# Patient Record
Sex: FEMALE | Race: WHITE | NOT HISPANIC OR LATINO | Employment: OTHER | URBAN - METROPOLITAN AREA
[De-identification: names, ages, dates, MRNs, and addresses within clinical notes are randomized per-mention and may not be internally consistent; named-entity substitution may affect disease eponyms.]

---

## 2019-03-21 RX ORDER — ATORVASTATIN CALCIUM 20 MG/1
TABLET, FILM COATED ORAL
Qty: 90 TABLET | Refills: 3 | Status: SHIPPED | OUTPATIENT
Start: 2019-03-21 | End: 2019-03-26 | Stop reason: SDUPTHER

## 2019-03-26 DIAGNOSIS — E78.5 HYPERLIPIDEMIA, UNSPECIFIED HYPERLIPIDEMIA TYPE: Primary | ICD-10-CM

## 2019-03-26 RX ORDER — ATORVASTATIN CALCIUM 20 MG/1
20 TABLET, FILM COATED ORAL NIGHTLY
Qty: 90 TABLET | Refills: 3 | Status: SHIPPED | OUTPATIENT
Start: 2019-03-26 | End: 2020-06-08

## 2019-03-26 RX ORDER — AMLODIPINE BESYLATE 10 MG/1
TABLET ORAL
Qty: 90 TABLET | Refills: 3 | Status: SHIPPED | OUTPATIENT
Start: 2019-03-26 | End: 2020-03-31

## 2019-09-23 ENCOUNTER — OFFICE VISIT (OUTPATIENT)
Dept: FAMILY MEDICINE | Facility: CLINIC | Age: 79
End: 2019-09-23
Payer: MEDICARE

## 2019-09-23 VITALS
HEART RATE: 73 BPM | TEMPERATURE: 97 F | OXYGEN SATURATION: 98 % | DIASTOLIC BLOOD PRESSURE: 84 MMHG | WEIGHT: 209.63 LBS | SYSTOLIC BLOOD PRESSURE: 150 MMHG | BODY MASS INDEX: 35.79 KG/M2 | RESPIRATION RATE: 16 BRPM | HEIGHT: 64 IN

## 2019-09-23 DIAGNOSIS — N32.81 OVERACTIVE BLADDER: ICD-10-CM

## 2019-09-23 DIAGNOSIS — H35.3210 EXUDATIVE AGE-RELATED MACULAR DEGENERATION OF RIGHT EYE, UNSPECIFIED STAGE: ICD-10-CM

## 2019-09-23 DIAGNOSIS — R53.83 FATIGUE, UNSPECIFIED TYPE: ICD-10-CM

## 2019-09-23 DIAGNOSIS — E78.5 DYSLIPIDEMIA: ICD-10-CM

## 2019-09-23 DIAGNOSIS — Z12.39 BREAST CANCER SCREENING: ICD-10-CM

## 2019-09-23 DIAGNOSIS — F32.A DEPRESSIVE DISORDER: ICD-10-CM

## 2019-09-23 DIAGNOSIS — Z79.899 LONG TERM CURRENT USE OF THERAPEUTIC DRUG: ICD-10-CM

## 2019-09-23 DIAGNOSIS — I10 BENIGN ESSENTIAL HYPERTENSION: Primary | ICD-10-CM

## 2019-09-23 DIAGNOSIS — E55.9 VITAMIN D DEFICIENCY: ICD-10-CM

## 2019-09-23 PROBLEM — D32.9 BENIGN NEOPLASM OF MENINGES: Status: ACTIVE | Noted: 2019-09-23

## 2019-09-23 PROBLEM — K64.8 INTERNAL HEMORRHOIDS: Status: ACTIVE | Noted: 2018-03-21

## 2019-09-23 PROBLEM — M16.9 OSTEOARTHRITIS OF HIP: Status: ACTIVE | Noted: 2019-09-23

## 2019-09-23 PROBLEM — M17.9 OSTEOARTHRITIS OF KNEE: Status: ACTIVE | Noted: 2019-09-23

## 2019-09-23 PROBLEM — K64.4 EXTERNAL HEMORRHOIDS: Status: ACTIVE | Noted: 2018-03-21

## 2019-09-23 PROBLEM — E78.00 PURE HYPERCHOLESTEROLEMIA: Status: ACTIVE | Noted: 2019-09-23

## 2019-09-23 PROCEDURE — 99213 PR OFFICE/OUTPT VISIT, EST, LEVL III, 20-29 MIN: ICD-10-PCS | Mod: S$GLB,,, | Performed by: NURSE PRACTITIONER

## 2019-09-23 PROCEDURE — 99213 OFFICE O/P EST LOW 20 MIN: CPT | Mod: S$GLB,,, | Performed by: NURSE PRACTITIONER

## 2019-09-23 RX ORDER — CITALOPRAM 20 MG/1
1 TABLET, FILM COATED ORAL DAILY
COMMUNITY
End: 2019-09-25 | Stop reason: SDUPTHER

## 2019-09-23 RX ORDER — LOSARTAN POTASSIUM AND HYDROCHLOROTHIAZIDE 12.5; 1 MG/1; MG/1
1 TABLET ORAL DAILY
COMMUNITY
End: 2022-11-21

## 2019-09-23 RX ORDER — METOPROLOL SUCCINATE 50 MG/1
50 TABLET, EXTENDED RELEASE ORAL DAILY
COMMUNITY
End: 2021-08-09 | Stop reason: SDUPTHER

## 2019-09-24 DIAGNOSIS — R73.9 HYPERGLYCEMIA: Primary | ICD-10-CM

## 2019-09-25 RX ORDER — CITALOPRAM 20 MG/1
TABLET, FILM COATED ORAL
Qty: 90 TABLET | Refills: 3 | Status: SHIPPED | OUTPATIENT
Start: 2019-09-25 | End: 2020-10-21

## 2019-10-02 ENCOUNTER — TELEPHONE (OUTPATIENT)
Dept: PHYSICAL MEDICINE AND REHAB | Facility: CLINIC | Age: 79
End: 2019-10-02

## 2019-10-02 NOTE — TELEPHONE ENCOUNTER
Spoke with pt about lab results. Everything looks good, she is prediabetic and to watch sugar intake and starches.

## 2020-02-24 ENCOUNTER — OFFICE VISIT (OUTPATIENT)
Dept: FAMILY MEDICINE | Facility: CLINIC | Age: 80
End: 2020-02-24
Payer: MEDICARE

## 2020-02-24 VITALS
HEIGHT: 64 IN | RESPIRATION RATE: 16 BRPM | DIASTOLIC BLOOD PRESSURE: 68 MMHG | HEART RATE: 75 BPM | WEIGHT: 205.38 LBS | OXYGEN SATURATION: 98 % | SYSTOLIC BLOOD PRESSURE: 154 MMHG | BODY MASS INDEX: 35.06 KG/M2 | TEMPERATURE: 98 F

## 2020-02-24 DIAGNOSIS — M16.0 PRIMARY OSTEOARTHRITIS OF BOTH HIPS: ICD-10-CM

## 2020-02-24 DIAGNOSIS — R20.0 NUMBNESS AND TINGLING IN LEFT ARM: ICD-10-CM

## 2020-02-24 DIAGNOSIS — M19.011 LOCALIZED OSTEOARTHRITIS OF SHOULDER REGIONS, BILATERAL: ICD-10-CM

## 2020-02-24 DIAGNOSIS — M17.0 PRIMARY OSTEOARTHRITIS OF BOTH KNEES: Primary | ICD-10-CM

## 2020-02-24 DIAGNOSIS — M19.012 LOCALIZED OSTEOARTHRITIS OF SHOULDER REGIONS, BILATERAL: ICD-10-CM

## 2020-02-24 DIAGNOSIS — R20.2 NUMBNESS AND TINGLING IN LEFT ARM: ICD-10-CM

## 2020-02-24 PROCEDURE — 99214 PR OFFICE/OUTPT VISIT, EST, LEVL IV, 30-39 MIN: ICD-10-PCS | Mod: S$GLB,,, | Performed by: NURSE PRACTITIONER

## 2020-02-24 PROCEDURE — 99214 OFFICE O/P EST MOD 30 MIN: CPT | Mod: S$GLB,,, | Performed by: NURSE PRACTITIONER

## 2020-02-24 RX ORDER — GABAPENTIN 100 MG/1
100 CAPSULE ORAL 3 TIMES DAILY
Qty: 90 CAPSULE | Refills: 11 | Status: SHIPPED | OUTPATIENT
Start: 2020-02-24 | End: 2021-04-08

## 2020-02-24 NOTE — PROGRESS NOTES
Subjective:       Patient ID: Iman Wood is a 79 y.o. female.    Chief Complaint: Referral (Evaluation of Mechanical scooter) and Pain (joint pain to bilateral shoulders)    HPI     C/o bilateral knee pain, hip pain, and shoulder pain for > 1 year. Mobility increasing difficult as a result of her osteoarthritis. Unable to ambulate more than 200 ft without stopping to rest/sit. This is making it difficult to carry out her routine ADLs.  She is requesting a motorized scooter--specifically states NOT at motorized wheelchair. She spoke with a medicare representative who told her to have her PCP order one.     Review of Systems   Constitutional: Positive for fatigue. Negative for activity change, appetite change, chills and fever.   HENT: Negative for congestion, hearing loss, nosebleeds, postnasal drip, sinus pressure, sore throat and trouble swallowing.    Respiratory: Negative for cough, chest tightness, shortness of breath and wheezing.    Cardiovascular: Negative for chest pain and palpitations.   Gastrointestinal: Negative for abdominal distention, abdominal pain, constipation, diarrhea, nausea and vomiting.   Genitourinary: Negative for difficulty urinating, flank pain, frequency, hematuria and urgency.   Musculoskeletal: Positive for arthralgias and gait problem. Negative for back pain, joint swelling, neck pain and neck stiffness.   Skin: Negative for color change and pallor.   Neurological: Negative for dizziness, seizures, syncope, weakness, light-headedness and headaches.   Hematological: Negative for adenopathy. Does not bruise/bleed easily.   Psychiatric/Behavioral: Negative for agitation, behavioral problems, confusion and sleep disturbance. The patient is not nervous/anxious.        Objective:      Physical Exam   Constitutional: She is oriented to person, place, and time. She appears well-developed and well-nourished.   HENT:   Head: Normocephalic and atraumatic.   Mouth/Throat: Oropharynx is clear and  moist.   Eyes: Pupils are equal, round, and reactive to light. Conjunctivae and EOM are normal. No scleral icterus.   Neck: Trachea normal and normal range of motion. Neck supple. Carotid bruit is not present. No thyroid mass present.   Cardiovascular: Normal rate, regular rhythm and normal heart sounds.   Pulmonary/Chest: Effort normal and breath sounds normal.   Abdominal: Soft. Bowel sounds are normal.   Musculoskeletal: Normal range of motion. She exhibits tenderness. She exhibits no edema or deformity.   Neurological: She is alert and oriented to person, place, and time.   Skin: Skin is warm and dry.   Psychiatric: She has a normal mood and affect. Her behavior is normal. Judgment normal.       Assessment:       1. Primary osteoarthritis of both knees    2. Primary osteoarthritis of both hips    3. Localized osteoarthritis of shoulder regions, bilateral    4. Numbness and tingling in left arm        Plan:       PROBLEM LIST     Iman was seen today for referral and pain.    Diagnoses and all orders for this visit:    Primary osteoarthritis of both knees  -     MOTORIZED SCOOTER FOR HOME USE    Primary osteoarthritis of both hips  -     MOTORIZED SCOOTER FOR HOME USE    Localized osteoarthritis of shoulder regions, bilateral  -     MOTORIZED SCOOTER FOR HOME USE    Numbness and tingling in left arm  -     gabapentin (NEURONTIN) 100 MG capsule; Take 1 capsule (100 mg total) by mouth 3 (three) times daily.    Will send order for mobility assessment for motorized scooter that she is requesting. Instructed her to continue naproxen 500 mg at bedtime. Starting on gabapentin 100 mg TID prn.     I spent 25 minutes with the patient face-to-face, more than 50% was in counseling about medication and patient condition

## 2020-03-31 RX ORDER — AMLODIPINE BESYLATE 10 MG/1
TABLET ORAL
Qty: 90 TABLET | Refills: 3 | Status: SHIPPED | OUTPATIENT
Start: 2020-03-31 | End: 2021-08-09

## 2021-02-25 ENCOUNTER — OFFICE VISIT (OUTPATIENT)
Dept: FAMILY MEDICINE | Facility: CLINIC | Age: 81
End: 2021-02-25
Payer: MEDICARE

## 2021-02-25 VITALS
SYSTOLIC BLOOD PRESSURE: 138 MMHG | DIASTOLIC BLOOD PRESSURE: 76 MMHG | HEART RATE: 72 BPM | WEIGHT: 212.63 LBS | HEIGHT: 64 IN | RESPIRATION RATE: 16 BRPM | OXYGEN SATURATION: 96 % | BODY MASS INDEX: 36.3 KG/M2 | TEMPERATURE: 98 F

## 2021-02-25 DIAGNOSIS — E78.5 DYSLIPIDEMIA: Chronic | ICD-10-CM

## 2021-02-25 DIAGNOSIS — I10 BENIGN ESSENTIAL HYPERTENSION: Chronic | ICD-10-CM

## 2021-02-25 DIAGNOSIS — M26.609 TMJ (TEMPOROMANDIBULAR JOINT SYNDROME): Primary | ICD-10-CM

## 2021-02-25 DIAGNOSIS — F32.A DEPRESSIVE DISORDER: Chronic | ICD-10-CM

## 2021-02-25 PROCEDURE — 99214 OFFICE O/P EST MOD 30 MIN: CPT | Mod: S$GLB,,, | Performed by: NURSE PRACTITIONER

## 2021-02-25 PROCEDURE — 99214 PR OFFICE/OUTPT VISIT, EST, LEVL IV, 30-39 MIN: ICD-10-PCS | Mod: S$GLB,,, | Performed by: NURSE PRACTITIONER

## 2021-03-26 LAB
ABS NRBC COUNT: 0 X 10 3/UL (ref 0–0.01)
ABSOLUTE BASOPHIL: 0.04 X 10 3/UL (ref 0–0.22)
ABSOLUTE EOSINOPHIL: 0.24 X 10 3/UL (ref 0.04–0.54)
ABSOLUTE IMMATURE GRAN: 0.02 X 10 3/UL (ref 0–0.04)
ABSOLUTE LYMPHOCYTE: 1.57 X 10 3/UL (ref 0.86–4.75)
ABSOLUTE MONOCYTE: 0.51 X 10 3/UL (ref 0.22–1.08)
ALBUMIN SERPL-MCNC: 4.6 G/DL (ref 3.5–5.2)
ALBUMIN/GLOB SERPL ELPH: 1.5 {RATIO} (ref 1–2.7)
ALP ISOS SERPL LEV INH-CCNC: 79 U/L (ref 35–105)
ALT (SGPT): 11 U/L (ref 0–33)
ANION GAP SERPL CALC-SCNC: 12 MMOL/L (ref 8–17)
AST SERPL-CCNC: 16 U/L (ref 0–32)
BASOPHILS NFR BLD: 0.6 % (ref 0.2–1.2)
BILIRUBIN, TOTAL: 0.44 MG/DL (ref 0–1.2)
BUN/CREAT SERPL: 21.7 (ref 6–20)
CALCIUM SERPL-MCNC: 10.1 MG/DL (ref 8.6–10.2)
CARBON DIOXIDE, CO2: 30 MMOL/L (ref 22–29)
CHLORIDE: 102 MMOL/L (ref 98–107)
CHOLEST SERPL-MSCNC: 192 MG/DL (ref 100–200)
CREAT SERPL-MCNC: 1.42 MG/DL (ref 0.5–0.9)
EOSINOPHIL NFR BLD: 3.9 % (ref 0.7–7)
GFR ESTIMATION: 35.59
GLOBULIN: 3 G/DL (ref 1.5–4.5)
GLUCOSE: 104 MG/DL (ref 82–115)
HCT VFR BLD AUTO: 46.5 % (ref 37–47)
HDLC SERPL-MCNC: 84 MG/DL
HGB BLD-MCNC: 13.8 G/DL (ref 12–16)
IMMATURE GRANULOCYTES: 0.3 % (ref 0–0.5)
LDL/HDL RATIO: 0.8 (ref 1–3)
LDLC SERPL CALC-MCNC: 66.6 MG/DL (ref 0–100)
LYMPHOCYTES NFR BLD: 25.3 % (ref 19.3–53.1)
MCH RBC QN AUTO: 28.9 PG (ref 27–32)
MCHC RBC AUTO-ENTMCNC: 29.7 G/DL (ref 32–36)
MCV RBC AUTO: 97.5 FL (ref 82–100)
MONOCYTES NFR BLD: 8.2 % (ref 4.7–12.5)
NEUTROPHILS # BLD AUTO: 3.82 X 10 3/UL (ref 2.15–7.56)
NEUTROPHILS NFR BLD: 61.7 %
NUCLEATED RED BLOOD CELLS: 0 /100 WBC (ref 0–0.2)
PLATELET # BLD AUTO: 242 X 10 3/UL (ref 135–400)
POTASSIUM: 3.8 MMOL/L (ref 3.5–5.1)
PROT SNV-MCNC: 7.6 G/DL (ref 6.4–8.3)
RBC # BLD AUTO: 4.77 X 10 6/UL (ref 4.2–5.4)
RDW-SD: 52.4 FL (ref 37–54)
SODIUM: 144 MMOL/L (ref 136–145)
T4, FREE: 1.18 NG/DL (ref 0.93–1.7)
TRIGL SERPL-MCNC: 207 MG/DL (ref 0–150)
TSH SERPL DL<=0.005 MIU/L-ACNC: 1.61 UIU/ML (ref 0.27–4.2)
UREA NITROGEN (BUN): 30.8 MG/DL (ref 8–23)
WBC # BLD: 6.2 X 10 3/UL (ref 4.3–10.8)

## 2021-04-08 ENCOUNTER — OFFICE VISIT (OUTPATIENT)
Dept: FAMILY MEDICINE | Facility: CLINIC | Age: 81
End: 2021-04-08
Payer: MEDICARE

## 2021-04-08 VITALS
TEMPERATURE: 97 F | SYSTOLIC BLOOD PRESSURE: 138 MMHG | OXYGEN SATURATION: 96 % | HEART RATE: 68 BPM | HEIGHT: 64 IN | RESPIRATION RATE: 16 BRPM | BODY MASS INDEX: 35.68 KG/M2 | DIASTOLIC BLOOD PRESSURE: 78 MMHG | WEIGHT: 209 LBS

## 2021-04-08 DIAGNOSIS — M81.0 OSTEOPOROSIS, POSTMENOPAUSAL: ICD-10-CM

## 2021-04-08 DIAGNOSIS — R53.1 ASTHENIA: ICD-10-CM

## 2021-04-08 DIAGNOSIS — E78.5 DYSLIPIDEMIA: ICD-10-CM

## 2021-04-08 DIAGNOSIS — Z13.820 OSTEOPOROSIS SCREENING: ICD-10-CM

## 2021-04-08 DIAGNOSIS — I10 BENIGN ESSENTIAL HYPERTENSION: Chronic | ICD-10-CM

## 2021-04-08 DIAGNOSIS — R73.9 HYPERGLYCEMIA: ICD-10-CM

## 2021-04-08 DIAGNOSIS — R35.89 POLYURIA: Primary | ICD-10-CM

## 2021-04-08 DIAGNOSIS — M26.609 TMJ (TEMPOROMANDIBULAR JOINT SYNDROME): ICD-10-CM

## 2021-04-08 DIAGNOSIS — R73.03 PREDIABETES: ICD-10-CM

## 2021-04-08 DIAGNOSIS — E66.01 CLASS 2 SEVERE OBESITY DUE TO EXCESS CALORIES WITH SERIOUS COMORBIDITY AND BODY MASS INDEX (BMI) OF 35.0 TO 35.9 IN ADULT: ICD-10-CM

## 2021-04-08 PROCEDURE — 83036 HEMOGLOBIN GLYCOSYLATED A1C: CPT | Mod: QW,S$GLB,, | Performed by: NURSE PRACTITIONER

## 2021-04-08 PROCEDURE — 83036 PR  GLYCOSYLATED HEMOGLOBIN TEST: ICD-10-PCS | Mod: QW,S$GLB,, | Performed by: NURSE PRACTITIONER

## 2021-04-08 PROCEDURE — 99214 PR OFFICE/OUTPT VISIT, EST, LEVL IV, 30-39 MIN: ICD-10-PCS | Mod: 25,S$GLB,, | Performed by: NURSE PRACTITIONER

## 2021-04-08 PROCEDURE — 99214 OFFICE O/P EST MOD 30 MIN: CPT | Mod: 25,S$GLB,, | Performed by: NURSE PRACTITIONER

## 2021-04-08 RX ORDER — TIZANIDINE 4 MG/1
4 TABLET ORAL NIGHTLY
COMMUNITY
Start: 2021-03-04 | End: 2021-04-08

## 2021-04-08 RX ORDER — TIZANIDINE 4 MG/1
4 TABLET ORAL NIGHTLY
Qty: 90 TABLET | Refills: 1 | Status: SHIPPED | OUTPATIENT
Start: 2021-04-08 | End: 2021-08-09

## 2021-05-03 ENCOUNTER — TELEPHONE (OUTPATIENT)
Dept: FAMILY MEDICINE | Facility: CLINIC | Age: 81
End: 2021-05-03

## 2021-05-04 DIAGNOSIS — Z12.11 COLON CANCER SCREENING: Primary | ICD-10-CM

## 2021-05-05 ENCOUNTER — OFFICE VISIT (OUTPATIENT)
Dept: FAMILY MEDICINE | Facility: CLINIC | Age: 81
End: 2021-05-05
Payer: MEDICARE

## 2021-05-05 VITALS
OXYGEN SATURATION: 99 % | BODY MASS INDEX: 36.05 KG/M2 | DIASTOLIC BLOOD PRESSURE: 83 MMHG | WEIGHT: 211.19 LBS | HEART RATE: 75 BPM | TEMPERATURE: 98 F | SYSTOLIC BLOOD PRESSURE: 174 MMHG | RESPIRATION RATE: 18 BRPM | HEIGHT: 64 IN

## 2021-05-05 DIAGNOSIS — M10.9 ACUTE GOUTY ARTHROPATHY: Primary | ICD-10-CM

## 2021-05-05 DIAGNOSIS — R26.89 IMPAIRED GAIT AND MOBILITY: ICD-10-CM

## 2021-05-05 DIAGNOSIS — I10 BENIGN ESSENTIAL HYPERTENSION: ICD-10-CM

## 2021-05-05 PROCEDURE — 99213 PR OFFICE/OUTPT VISIT, EST, LEVL III, 20-29 MIN: ICD-10-PCS | Mod: 25,S$GLB,, | Performed by: NURSE PRACTITIONER

## 2021-05-05 PROCEDURE — 96372 PR INJECTION,THERAP/PROPH/DIAG2ST, IM OR SUBCUT: ICD-10-PCS | Mod: S$GLB,,, | Performed by: NURSE PRACTITIONER

## 2021-05-05 PROCEDURE — 99213 OFFICE O/P EST LOW 20 MIN: CPT | Mod: 25,S$GLB,, | Performed by: NURSE PRACTITIONER

## 2021-05-05 PROCEDURE — 96372 THER/PROPH/DIAG INJ SC/IM: CPT | Mod: S$GLB,,, | Performed by: NURSE PRACTITIONER

## 2021-05-05 RX ORDER — BETAMETHASONE SODIUM PHOSPHATE AND BETAMETHASONE ACETATE 3; 3 MG/ML; MG/ML
12 INJECTION, SUSPENSION INTRA-ARTICULAR; INTRALESIONAL; INTRAMUSCULAR; SOFT TISSUE ONCE
Status: COMPLETED | OUTPATIENT
Start: 2021-05-05 | End: 2021-05-05

## 2021-05-05 RX ORDER — TRAMADOL HYDROCHLORIDE 50 MG/1
50 TABLET ORAL EVERY 8 HOURS PRN
Qty: 15 TABLET | Refills: 0 | Status: SHIPPED | OUTPATIENT
Start: 2021-05-05 | End: 2021-05-10

## 2021-05-05 RX ORDER — INDOMETHACIN 50 MG/1
50 CAPSULE ORAL
Qty: 15 CAPSULE | Refills: 0 | Status: SHIPPED | OUTPATIENT
Start: 2021-05-05 | End: 2021-05-10

## 2021-05-05 RX ADMIN — BETAMETHASONE SODIUM PHOSPHATE AND BETAMETHASONE ACETATE 12 MG: 3; 3 INJECTION, SUSPENSION INTRA-ARTICULAR; INTRALESIONAL; INTRAMUSCULAR; SOFT TISSUE at 11:05

## 2021-08-09 RX ORDER — METOPROLOL SUCCINATE 50 MG/1
50 TABLET, EXTENDED RELEASE ORAL DAILY
Qty: 90 TABLET | Refills: 3 | Status: SHIPPED | OUTPATIENT
Start: 2021-08-09 | End: 2022-05-31

## 2021-09-07 LAB — CRC RECOMMENDATION EXT: NORMAL

## 2021-09-21 ENCOUNTER — OFFICE VISIT (OUTPATIENT)
Dept: FAMILY MEDICINE | Facility: CLINIC | Age: 81
End: 2021-09-21
Payer: MEDICARE

## 2021-09-21 VITALS
TEMPERATURE: 99 F | DIASTOLIC BLOOD PRESSURE: 76 MMHG | RESPIRATION RATE: 16 BRPM | SYSTOLIC BLOOD PRESSURE: 141 MMHG | HEART RATE: 74 BPM | WEIGHT: 211.81 LBS | OXYGEN SATURATION: 98 % | BODY MASS INDEX: 36.16 KG/M2 | HEIGHT: 64 IN

## 2021-09-21 DIAGNOSIS — M10.9 GOUT, ARTHROPATHY: ICD-10-CM

## 2021-09-21 DIAGNOSIS — H10.13 ALLERGIC CONJUNCTIVITIS OF BOTH EYES: Primary | ICD-10-CM

## 2021-09-21 DIAGNOSIS — R05.9 COUGH: ICD-10-CM

## 2021-09-21 PROCEDURE — 99213 PR OFFICE/OUTPT VISIT, EST, LEVL III, 20-29 MIN: ICD-10-PCS | Mod: S$GLB,,, | Performed by: NURSE PRACTITIONER

## 2021-09-21 PROCEDURE — 99213 OFFICE O/P EST LOW 20 MIN: CPT | Mod: S$GLB,,, | Performed by: NURSE PRACTITIONER

## 2021-09-21 RX ORDER — COLCHICINE 0.6 MG/1
TABLET ORAL
Qty: 7 TABLET | Refills: 3 | Status: SHIPPED | OUTPATIENT
Start: 2021-09-21 | End: 2023-03-03 | Stop reason: SDUPTHER

## 2021-09-21 RX ORDER — BENZONATATE 200 MG/1
200 CAPSULE ORAL 3 TIMES DAILY PRN
Qty: 30 CAPSULE | Refills: 0 | Status: SHIPPED | OUTPATIENT
Start: 2021-09-21 | End: 2021-10-01

## 2021-09-21 RX ORDER — AZELASTINE HYDROCHLORIDE 0.5 MG/ML
1 SOLUTION/ DROPS OPHTHALMIC 2 TIMES DAILY
Qty: 4 ML | Refills: 0 | Status: SHIPPED | OUTPATIENT
Start: 2021-09-21 | End: 2021-10-18

## 2021-11-22 RX ORDER — CITALOPRAM 20 MG/1
20 TABLET, FILM COATED ORAL DAILY
Qty: 90 TABLET | Refills: 3 | Status: SHIPPED | OUTPATIENT
Start: 2021-11-22 | End: 2022-12-02

## 2021-12-28 ENCOUNTER — TELEPHONE (OUTPATIENT)
Dept: FAMILY MEDICINE | Facility: CLINIC | Age: 81
End: 2021-12-28
Payer: MEDICARE
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
## 2021-12-28 NOTE — TELEPHONE ENCOUNTER
----- Message from Evonne Moseley sent at 12/28/2021  3:25 PM CST -----  Contact: PT  .Type:  Patient Returning Call    Who Called: Iman   Who Left Message for Patient:   Does the patient know what this is regarding?: missed call   Would the patient rather a call back or a response via [facility]?  Callback   Best Call Back Number: [phone] (home)    Additional Information:

## 2021-12-29 PROBLEM — H35.3190 NONEXUDATIVE AGE-RELATED MACULAR DEGENERATION: Status: ACTIVE | Noted: 2021-12-29

## 2021-12-29 PROBLEM — Z52.5: Status: ACTIVE | Noted: 2021-12-29

## 2022-03-10 ENCOUNTER — OFFICE VISIT (OUTPATIENT)
Dept: FAMILY MEDICINE | Facility: CLINIC | Age: 82
End: 2022-03-10
Payer: MEDICARE

## 2022-03-10 VITALS
SYSTOLIC BLOOD PRESSURE: 136 MMHG | DIASTOLIC BLOOD PRESSURE: 73 MMHG | WEIGHT: 205 LBS | OXYGEN SATURATION: 97 % | HEIGHT: 64 IN | HEART RATE: 84 BPM | BODY MASS INDEX: 35 KG/M2 | TEMPERATURE: 97 F | RESPIRATION RATE: 18 BRPM

## 2022-03-10 DIAGNOSIS — H21.241: Chronic | ICD-10-CM

## 2022-03-10 DIAGNOSIS — H54.7 LOW VISION, UNSPECIFIED LEFT EYE VISUAL IMPAIRMENT CATEGORY, UNSPECIFIED RIGHT EYE VISUAL IMPAIRMENT CATEGORY: Chronic | ICD-10-CM

## 2022-03-10 DIAGNOSIS — H02.401 PTOSIS OF RIGHT UPPER EYELID: ICD-10-CM

## 2022-03-10 DIAGNOSIS — M10.9 GOUTY ARTHROPATHY: ICD-10-CM

## 2022-03-10 DIAGNOSIS — I10 BENIGN ESSENTIAL HYPERTENSION: Chronic | ICD-10-CM

## 2022-03-10 DIAGNOSIS — H35.3190 NONEXUDATIVE AGE-RELATED MACULAR DEGENERATION, UNSPECIFIED LATERALITY, UNSPECIFIED STAGE: ICD-10-CM

## 2022-03-10 DIAGNOSIS — E78.2 MIXED HYPERLIPIDEMIA: Chronic | ICD-10-CM

## 2022-03-10 DIAGNOSIS — H61.23 BILATERAL IMPACTED CERUMEN: Primary | ICD-10-CM

## 2022-03-10 PROBLEM — E78.5 DYSLIPIDEMIA: Status: RESOLVED | Noted: 2019-09-23 | Resolved: 2022-03-10

## 2022-03-10 PROCEDURE — 69209 PR REMOVAL IMPACTED CERUMEN USING IRRIGATION/LAVAGE, UNILATERAL: ICD-10-PCS | Mod: 50,S$GLB,, | Performed by: NURSE PRACTITIONER

## 2022-03-10 PROCEDURE — 69209 REMOVE IMPACTED EAR WAX UNI: CPT | Mod: 50,S$GLB,, | Performed by: NURSE PRACTITIONER

## 2022-03-10 PROCEDURE — 99213 OFFICE O/P EST LOW 20 MIN: CPT | Mod: 25,S$GLB,, | Performed by: NURSE PRACTITIONER

## 2022-03-10 PROCEDURE — 99213 PR OFFICE/OUTPT VISIT, EST, LEVL III, 20-29 MIN: ICD-10-PCS | Mod: 25,S$GLB,, | Performed by: NURSE PRACTITIONER
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
## 2022-04-04 ENCOUNTER — OFFICE VISIT (OUTPATIENT)
Dept: FAMILY MEDICINE | Facility: CLINIC | Age: 82
End: 2022-04-04
Payer: MEDICARE

## 2022-04-04 VITALS
WEIGHT: 206 LBS | HEART RATE: 86 BPM | BODY MASS INDEX: 35.36 KG/M2 | DIASTOLIC BLOOD PRESSURE: 80 MMHG | SYSTOLIC BLOOD PRESSURE: 151 MMHG | RESPIRATION RATE: 16 BRPM | OXYGEN SATURATION: 98 %

## 2022-04-04 DIAGNOSIS — T78.40XA ALLERGY, INITIAL ENCOUNTER: ICD-10-CM

## 2022-04-04 DIAGNOSIS — Z76.89 ESTABLISHING CARE WITH NEW DOCTOR, ENCOUNTER FOR: Primary | ICD-10-CM

## 2022-04-04 DIAGNOSIS — F32.A DEPRESSIVE DISORDER: ICD-10-CM

## 2022-04-04 DIAGNOSIS — I10 HYPERTENSION, UNSPECIFIED TYPE: ICD-10-CM

## 2022-04-04 DIAGNOSIS — Z79.899 LONG TERM CURRENT USE OF THERAPEUTIC DRUG: ICD-10-CM

## 2022-04-04 DIAGNOSIS — E78.00 PURE HYPERCHOLESTEROLEMIA: ICD-10-CM

## 2022-04-04 DIAGNOSIS — R11.0 NAUSEA: ICD-10-CM

## 2022-04-04 DIAGNOSIS — R05.9 COUGH: ICD-10-CM

## 2022-04-04 DIAGNOSIS — R73.03 PREDIABETES: ICD-10-CM

## 2022-04-04 DIAGNOSIS — R23.2 HOT FLASHES: ICD-10-CM

## 2022-04-04 DIAGNOSIS — N18.31 STAGE 3A CHRONIC KIDNEY DISEASE: ICD-10-CM

## 2022-04-04 PROCEDURE — 99214 PR OFFICE/OUTPT VISIT, EST, LEVL IV, 30-39 MIN: ICD-10-PCS | Mod: S$GLB,,, | Performed by: NURSE PRACTITIONER

## 2022-04-04 PROCEDURE — 99214 OFFICE O/P EST MOD 30 MIN: CPT | Mod: S$GLB,,, | Performed by: NURSE PRACTITIONER

## 2022-04-04 RX ORDER — LORATADINE 10 MG/1
10 TABLET ORAL DAILY
Qty: 30 TABLET | Refills: 11 | Status: SHIPPED | OUTPATIENT
Start: 2022-04-04 | End: 2022-10-18
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
## 2022-04-08 LAB
% SATURATION: 17 % (ref 20–50)
25(OH)D3 SERPL-MCNC: 30 NG/ML
ALBUMIN SERPL BCP-MCNC: 3.8 G/DL (ref 3.4–5)
ALBUMIN/GLOBULIN RATIO: 0.88 RATIO (ref 1.1–1.8)
ALP SERPL-CCNC: 80 U/L (ref 46–116)
ALT SERPL W P-5'-P-CCNC: 14 U/L (ref 12–78)
ANION GAP SERPL CALC-SCNC: 11 MMOL/L (ref 3–11)
APPEARANCE, UA: ABNORMAL
AST SERPL-CCNC: 19 U/L (ref 15–37)
BACTERIA SPEC CULT: ABNORMAL /HPF
BASOPHILS NFR BLD: 0.6 % (ref 0–3)
BILIRUB SERPL-MCNC: 0.4 MG/DL (ref 0–1)
BILIRUB UR QL STRIP: NEGATIVE MG/DL
BUN SERPL-MCNC: 35 MG/DL (ref 7–18)
BUN/CREAT SERPL: 22.29 RATIO (ref 7–18)
CALCIUM SERPL-MCNC: 9.7 MG/DL (ref 8.8–10.5)
CHLORIDE SERPL-SCNC: 102 MMOL/L (ref 100–108)
CHOLEST SERPL-MSCNC: 199 MG/DL
CO2 SERPL-SCNC: 27 MMOL/L (ref 21–32)
COLOR UR: ABNORMAL
CREAT SERPL-MCNC: 1.57 MG/DL (ref 0.55–1.02)
CREATININE, RANDOM URINE: 148 MG/DL (ref 10–175)
CREATININE, URINE: 147.6 MG/DL (ref 10–175)
EOSINOPHIL NFR BLD: 3.5 % (ref 1–3)
ERYTHROCYTE [DISTWIDTH] IN BLOOD BY AUTOMATED COUNT: 14 % (ref 12.5–18)
FOLATE: 18.9 NG/ML (ref 3.1–17.5)
GFR ESTIMATION: 32
GLOBULIN: 4.3 G/DL (ref 2.3–3.5)
GLUCOSE (UA): NORMAL MG/DL
GLUCOSE SERPL-MCNC: 114 MG/DL (ref 70–110)
HBA1C MFR BLD: 6.3 % (ref 4.5–6.2)
HCT VFR BLD AUTO: 44.4 % (ref 37–47)
HDL/CHOLESTEROL RATIO: 2.2 RATIO
HDLC SERPL-MCNC: 92 MG/DL (ref 39–96)
HGB BLD-MCNC: 14.1 G/DL (ref 12–16)
HGB UR QL STRIP: 50 /UL
IRON: 55 UG/DL (ref 26–170)
KETONES UR QL STRIP: NEGATIVE MG/DL
LDLC SERPL CALC-MCNC: 70.8 MG/DL
LEUKOCYTE ESTERASE UR QL STRIP: 500 /UL
LYMPHOCYTES NFR BLD: 18.2 % (ref 25–40)
MAGNESIUM SERPL-MCNC: 2.4 MG/DL (ref 1.8–2.4)
MCH RBC QN AUTO: 29.3 PG (ref 27–31.2)
MCHC RBC AUTO-ENTMCNC: 31.8 G/DL (ref 31.8–35.4)
MCV RBC AUTO: 92.3 FL (ref 80–97)
MICROALBUMIN URINE: 42.3 MG/L (ref 0–20)
MICROALBUMIN/CREATININE RATIO: 28 MG/G (ref 0–30)
MONOCYTES NFR BLD: 7.6 % (ref 1–15)
NEUTROPHILS # BLD AUTO: 5.54 10*3/UL (ref 1.8–7.7)
NEUTROPHILS NFR BLD: 69.8 % (ref 37–80)
NITRITE UR QL STRIP: NEGATIVE
NUCLEATED RED BLOOD CELLS: 0 %
PH UR STRIP: 6 PH (ref 5–9)
PLATELETS: 234 10*3/UL (ref 142–424)
POTASSIUM SERPL-SCNC: 4.3 MMOL/L (ref 3.6–5.2)
PROT SERPL-MCNC: 8.1 G/DL (ref 6.4–8.2)
PROT UR QL STRIP: ABNORMAL MG/DL
RBC # BLD AUTO: 4.81 10*6/UL (ref 4.2–5.4)
RBC #/AREA URNS HPF: ABNORMAL /HPF (ref 0–2)
SERVICE COMMENT 03: ABNORMAL
SODIUM BLD-SCNC: 140 MMOL/L (ref 135–145)
SP GR UR STRIP: 1.02 (ref 1–1.03)
SPECIMEN COLLECTION METHOD, URINE: ABNORMAL
SQUAMOUS EPITHELIAL, UA: ABNORMAL /LPF
T4 FREE SP9 P CHAL SERPL-SCNC: 1 NG/DL (ref 0.76–1.46)
TOTAL IRON BINDING CAPACITY: 320 UG/DL (ref 250–450)
TRIGL SERPL-MCNC: 181 MG/DL (ref 30–200)
TSH SERPL DL<=0.005 MIU/L-ACNC: 1.41 UIU/ML (ref 0.36–3.74)
UROBILINOGEN UR STRIP-ACNC: NORMAL MG/DL
VITAMIN B12: 442 PG/ML (ref 193–986)
VLDL CHOLESTEROL: 36 MG/DL (ref 0–40)
WBC # BLD: 7.9 10*3/UL (ref 4.6–10.2)
WBC #/AREA URNS HPF: ABNORMAL /HPF (ref 0–5)

## 2022-04-09 LAB — URINE CULTURE, ROUTINE: NORMAL

## 2022-04-20 ENCOUNTER — OFFICE VISIT (OUTPATIENT)
Dept: FAMILY MEDICINE | Facility: CLINIC | Age: 82
End: 2022-04-20
Payer: MEDICARE

## 2022-04-20 VITALS
HEART RATE: 65 BPM | RESPIRATION RATE: 16 BRPM | SYSTOLIC BLOOD PRESSURE: 144 MMHG | OXYGEN SATURATION: 99 % | DIASTOLIC BLOOD PRESSURE: 70 MMHG

## 2022-04-20 DIAGNOSIS — Z71.2 ENCOUNTER TO DISCUSS TEST RESULTS: ICD-10-CM

## 2022-04-20 DIAGNOSIS — R31.29 MICROSCOPIC HEMATURIA: ICD-10-CM

## 2022-04-20 DIAGNOSIS — R73.03 PREDIABETES: ICD-10-CM

## 2022-04-20 DIAGNOSIS — Z79.899 LONG TERM CURRENT USE OF THERAPEUTIC DRUG: ICD-10-CM

## 2022-04-20 DIAGNOSIS — E61.1 IRON DEFICIENCY: ICD-10-CM

## 2022-04-20 DIAGNOSIS — I10 BENIGN ESSENTIAL HYPERTENSION: ICD-10-CM

## 2022-04-20 DIAGNOSIS — R77.1 ABNORMALITY OF GLOBULIN: ICD-10-CM

## 2022-04-20 DIAGNOSIS — N18.32 STAGE 3B CHRONIC KIDNEY DISEASE: Primary | ICD-10-CM

## 2022-04-20 PROCEDURE — 99214 PR OFFICE/OUTPT VISIT, EST, LEVL IV, 30-39 MIN: ICD-10-PCS | Mod: S$GLB,,, | Performed by: NURSE PRACTITIONER

## 2022-04-20 PROCEDURE — 99214 OFFICE O/P EST MOD 30 MIN: CPT | Mod: S$GLB,,, | Performed by: NURSE PRACTITIONER

## 2022-04-20 RX ORDER — METFORMIN HYDROCHLORIDE 500 MG/1
500 TABLET, EXTENDED RELEASE ORAL
Qty: 90 TABLET | Refills: 0 | Status: SHIPPED | OUTPATIENT
Start: 2022-04-20 | End: 2022-05-31

## 2022-04-20 RX ORDER — FERROUS GLUCONATE 324(38)MG
TABLET ORAL
Qty: 30 TABLET | Refills: 0 | Status: SHIPPED | OUTPATIENT
Start: 2022-04-20 | End: 2022-06-13 | Stop reason: SDUPTHER

## 2022-04-20 NOTE — ASSESSMENT & PLAN NOTE
BP slightly elevated in office. She was advised to continue monitoring BP and f/u if above 140s.  Suspect this is related to her anxiety about today's visit.
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
## 2022-05-19 LAB
ALBUMIN SERPL BCP-MCNC: 4.1 G/DL (ref 3.4–5)
ALBUMIN/GLOBULIN RATIO: 1.24 RATIO (ref 1.1–1.8)
ALP SERPL-CCNC: 75 U/L (ref 46–116)
ALT SERPL W P-5'-P-CCNC: 23 U/L (ref 12–78)
ANION GAP SERPL CALC-SCNC: 9 MMOL/L (ref 3–11)
AST SERPL-CCNC: 17 U/L (ref 15–37)
BILIRUB SERPL-MCNC: 0.4 MG/DL (ref 0–1)
BUN SERPL-MCNC: 44 MG/DL (ref 7–18)
BUN/CREAT SERPL: 23.91 RATIO (ref 7–18)
CALCIUM SERPL-MCNC: 9.6 MG/DL (ref 8.8–10.5)
CHLORIDE SERPL-SCNC: 106 MMOL/L (ref 100–108)
CO2 SERPL-SCNC: 27 MMOL/L (ref 21–32)
CREAT SERPL-MCNC: 1.84 MG/DL (ref 0.55–1.02)
GFR ESTIMATION: 26
GLOBULIN: 3.3 G/DL (ref 2.3–3.5)
GLUCOSE SERPL-MCNC: 112 MG/DL (ref 70–110)
POTASSIUM SERPL-SCNC: 4.2 MMOL/L (ref 3.6–5.2)
PROT SERPL-MCNC: 7.4 G/DL (ref 6.4–8.2)
SODIUM BLD-SCNC: 142 MMOL/L (ref 135–145)

## 2022-05-23 LAB
ALBUMIN, ELECTROPHORESIS: 4.05 G/DL (ref 3.75–5.01)
ALPHA1 GLOB FLD ELPH-MCNC: 0.44 G/DL (ref 0.19–0.46)
ALPHA2 GLOB FLD ELPH-MCNC: 0.9 G/DL (ref 0.48–1.05)
B-GLOBULIN FLD ELPH-MCNC: 1.01 G/DL (ref 0.48–1.1)
EER PROTEIN ELECTROPHORESIS, SERUM: NORMAL
GAMMA GLOB FLD ELPH-MCNC: 0.99 G/DL (ref 0.62–1.51)
IGA SERPL-MCNC: 399 MG/DL (ref 68–408)
IGG SERPL-MCNC: 852 MG/DL (ref 768–1632)
IGM: 20 MG/DL (ref 35–263)
IMMUNOFIXATION INTERPRETATION: NORMAL
INTERPRETATION: NORMAL
REPORT: NORMAL
TOTAL PROTEIN, ELECTROPHORESIS: 7.4 G/DL (ref 6.3–8.2)

## 2022-05-24 DIAGNOSIS — D80.4 IGM DEFICIENCY: ICD-10-CM

## 2022-05-24 DIAGNOSIS — N18.32 STAGE 3B CHRONIC KIDNEY DISEASE: Primary | ICD-10-CM

## 2022-05-24 DIAGNOSIS — R31.29 MICROSCOPIC HEMATURIA: ICD-10-CM

## 2022-05-31 DIAGNOSIS — N18.4 CKD (CHRONIC KIDNEY DISEASE) STAGE 4, GFR 15-29 ML/MIN: Primary | ICD-10-CM

## 2022-05-31 DIAGNOSIS — D80.4 IGM DEFICIENCY: ICD-10-CM

## 2022-05-31 DIAGNOSIS — R73.03 PREDIABETES: ICD-10-CM

## 2022-05-31 RX ORDER — METFORMIN HYDROCHLORIDE 500 MG/1
TABLET, EXTENDED RELEASE ORAL
Qty: 90 TABLET | Refills: 0 | Status: SHIPPED | OUTPATIENT
Start: 2022-05-31 | End: 2022-08-18

## 2022-05-31 NOTE — TELEPHONE ENCOUNTER
Please let her know that her kidneys have gotten worse since the last encounter. I recommend stopping metformin and getting an ultrasound of her kidneys. I also would like her to see a Nephro. This is a significant decline and I cant manage this if it gets any worse.      Send me her response then I will order the test and send referral

## 2022-06-08 ENCOUNTER — TELEPHONE (OUTPATIENT)
Dept: FAMILY MEDICINE | Facility: CLINIC | Age: 82
End: 2022-06-08
Payer: MEDICARE

## 2022-06-08 NOTE — TELEPHONE ENCOUNTER
----- Message from Romina Little sent at 6/8/2022 10:56 AM CDT -----  Regarding: test results  Contact: Nancy /daughter  Type:  Test Results    Who Called:  Nancy/daughter   Name of Test (Lab/Mammo/Etc):     Date of Test:  06/01/22  Ordering Provider:  Sravan   Where the test was performed:  [facility]  Would the patient rather a call back or a response via [facility]?  Call back   Best Call Back Number:  [phone]  Additional Information:   States that this is her 3rd call.

## 2022-06-08 NOTE — TELEPHONE ENCOUNTER
Patient/daughter notified of lab results. Patient's daughter, Nancy, concerned due to not receiving the US results from PATT Hinson NP's office. Contacted Dr. Madera's clinic to ensure they received referral. Referral ws received and office staff verbalizes they will be contacting patient within the next 3-4 days to set up appointment.

## 2022-08-18 ENCOUNTER — OFFICE VISIT (OUTPATIENT)
Dept: FAMILY MEDICINE | Facility: CLINIC | Age: 82
End: 2022-08-18
Payer: MEDICARE

## 2022-08-18 VITALS
SYSTOLIC BLOOD PRESSURE: 139 MMHG | OXYGEN SATURATION: 98 % | BODY MASS INDEX: 35.36 KG/M2 | HEART RATE: 67 BPM | WEIGHT: 206 LBS | DIASTOLIC BLOOD PRESSURE: 68 MMHG

## 2022-08-18 DIAGNOSIS — D47.2 MONOCLONAL GAMMOPATHY: Primary | ICD-10-CM

## 2022-08-18 DIAGNOSIS — N18.32 STAGE 3B CHRONIC KIDNEY DISEASE: ICD-10-CM

## 2022-08-18 DIAGNOSIS — I10 HYPERTENSION, UNSPECIFIED TYPE: ICD-10-CM

## 2022-08-18 DIAGNOSIS — N32.81 OVERACTIVE BLADDER: ICD-10-CM

## 2022-08-18 DIAGNOSIS — Z09 FOLLOW-UP EXAM, 3-6 MONTHS SINCE PREVIOUS EXAM: ICD-10-CM

## 2022-08-18 DIAGNOSIS — R73.03 PREDIABETES: ICD-10-CM

## 2022-08-18 PROCEDURE — 99214 PR OFFICE/OUTPT VISIT, EST, LEVL IV, 30-39 MIN: ICD-10-PCS | Mod: S$GLB,,, | Performed by: NURSE PRACTITIONER

## 2022-08-18 PROCEDURE — 99214 OFFICE O/P EST MOD 30 MIN: CPT | Mod: S$GLB,,, | Performed by: NURSE PRACTITIONER

## 2022-08-18 NOTE — ASSESSMENT & PLAN NOTE
Given a 7 day sample of Myrbetriq to trial. She will notify me if she wishes to continue this med.

## 2022-08-18 NOTE — PROGRESS NOTES
Subjective:      Patient ID: Iman Wood is a 81 y.o. female.    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 She has PMH HTN, depression/anxiety (following death of her son), HLD, gouty arthropathy, macular degeneration, and congenital ptosis right upper eyelid.    She is established with Dr Hutton at [facility] in [place]. She had abrupt loss of vision in Oct 2021. Blood clot discovered within eye. She is now followed by retinal specialist Dr Jeong. She is scheduled for entropion repair of her right eye w/ Dr Rodriguez next week. She has ptosis of the Lt eye.       The patient is here today accompanied by her daughter       CKD - followed by Dr. Madera. Noted to have IgG monoclonal gammopathy. Pending Heme/onc evaluation. UPEP negative.  Creat 1.4 now.       C/o frequency/nocturia. Keeping her up at night. Denies hematuria, dysuria, foul odor.       She reports no acute issues at this time.          Past Medical History:   Diagnosis Date    Eye degeneration     left    Hypertension       Social History     Socioeconomic History    Marital status:    Occupational History    Occupation: Retired RN   Tobacco Use    Smoking status: Never Smoker    Smokeless tobacco: Never Used   Substance and Sexual Activity    Alcohol use: Never    Drug use: Not Currently    Sexual activity: Not Currently      Family History   Problem Relation Age of Onset    Heart disease Mother     Heart disease Father     Cancer Sister     Cancer Son         ROS:   Review of Systems   Constitutional: Negative for activity change, appetite change, diaphoresis and fever.   HENT: Negative for ear discharge and ear pain.    Eyes: Positive for photophobia and visual disturbance. Negative for pain, redness and itching.   Respiratory: Negative for chest tightness and shortness of breath.    Cardiovascular: Negative for chest pain and palpitations.   Gastrointestinal:  Positive for nausea (occasional ). Negative for abdominal pain, diarrhea and vomiting.   Endocrine: Negative.    Genitourinary: Positive for frequency (nocturia). Negative for difficulty urinating, dysuria, flank pain, hematuria and urgency.   Musculoskeletal: Positive for arthralgias. Negative for joint swelling and myalgias.   Skin: Negative for color change and rash.   Allergic/Immunologic: Negative.    Neurological: Negative for dizziness, light-headedness and headaches.   Hematological: Negative for adenopathy. Does not bruise/bleed easily.   Psychiatric/Behavioral: Negative for dysphoric mood and sleep disturbance. The patient is not nervous/anxious.      Objective:   Physical Exam  Vitals and nursing note reviewed.   Constitutional:       General: She is awake. She is not in acute distress.     Appearance: She is well-developed and overweight. She is not ill-appearing.   HENT:      Head: Normocephalic and atraumatic.      Nose: No congestion or rhinorrhea.      Mouth/Throat:      Lips: Pink.      Mouth: Mucous membranes are moist.      Pharynx: Uvula midline. No oropharyngeal exudate, posterior oropharyngeal erythema or uvula swelling.      Tonsils: No tonsillar exudate.     Eyes:      General: No scleral icterus.        Right eye: Discharge present.         Left eye: Discharge present.     Conjunctiva/sclera:      Right eye: Right conjunctiva is injected.      Left eye: Left conjunctiva is injected.     Neck:      Thyroid: No thyroid mass.      Vascular: No carotid bruit.      Trachea: Trachea normal.   Cardiovascular:      Rate and Rhythm: Normal rate and regular rhythm.      Pulses: Normal pulses.      Heart sounds: Normal heart sounds. No murmur heard.  Pulmonary:      Effort: Pulmonary effort is normal.      Breath sounds: Normal breath sounds. No wheezing, rhonchi or rales.   Abdominal:      General: Bowel sounds are normal.      Palpations: Abdomen is soft.   Musculoskeletal:      Cervical back: Normal  range of motion and neck supple.      Right lower leg: No edema.      Left lower leg: No edema.   Skin:     General: Skin is warm and dry.      Capillary Refill: Capillary refill takes less than 2 seconds.      Coloration: Skin is not jaundiced.      Findings: No rash.   Neurological:      Mental Status: She is alert and oriented to person, place, and time. Mental status is at baseline.   Psychiatric:         Mood and Affect: Mood normal.         Behavior: Behavior normal. Behavior is cooperative.         Thought Content: Thought content normal.         Judgment: Judgment normal.       Assessment:     1. Monoclonal gammopathy    2. Stage 3b chronic kidney disease    3. Overactive bladder    4. Hypertension, unspecified type    5. Prediabetes    6. Follow-up exam, 3-6 months since previous exam      No images are attached to the encounter.   Plan:     Problem List Items Addressed This Visit        Cardiac/Vascular    Hypertension    Current Assessment & Plan     Controlled. Recommend she continue current regimen to protect her renal fct.               Renal/    Overactive bladder    Current Assessment & Plan     Given a 7 day sample of Myrbetriq to trial. She will notify me if she wishes to continue this med.            Stage 3b chronic kidney disease    Current Assessment & Plan         Followed by Dr. Madera. Sarah 1.4.  She is on BASIL diet. Metformin was discontinued.               Oncology    Monoclonal gammopathy - Primary    Current Assessment & Plan     IgG Kappa       Has Apt w/ Heme upcoming.               Endocrine    Prediabetes    Current Assessment & Plan     No longer on Metformin 2.t renal risk. Recommend diet mods.              Other Visit Diagnoses     Follow-up exam, 3-6 months since previous exam        Nephrology consultation reviewed.         Procedures     No visits with results within 1 Month(s) from this visit.   Latest known visit with results is:   Orders Only on 05/19/2022   Component Date  Value Ref Range Status    Interpretation 05/19/2022 SEE NOTE   Final    Comment: When TANIA GEL (Immunofixation Electrophoresis, test code 0244898)is ordered in conjunction with SPEP (Serum ProteinElectrophoresis, test code 5181376), the interpretive data forthe TANIA will chart with that of the SPEP.INTERPRETIVE INFORMATION: Immunofix   Electrophoresis, SerumThis information should be correlated with the results of serumprotein electrophoresis, quantitative immunoglobulins and otherclinical and laboratory information.      REPORT 05/19/2022 EERUNAVAILABLE   Final    Performed By: [facility] [place] [address] [id number] Director: Kat Adhikari MD    IgG 05/19/2022 852  768 - 1632 mg/dL Final    REFERENCE INTERVAL: Immunoglobulin GAccess complete set of age- and/or gender-specific referenceintervals for this test in the [facility] Test Directory([email or web address]).    IgA 05/19/2022 399  68 - 408 mg/dL Final    REFERENCE INTERVAL: Immunoglobulin AAccess complete set of age- and/or gender-specific referenceintervals for this test in the [facility] Test Directory([email or web address]).    IgM 05/19/2022 20 (A) 35 - 263 mg/dL Final    Comment: REFERENCE INTERVAL: Immunoglobulin MAccess complete set of age- and/or gender-specific referenceintervals for this test in the [facility] Test Directory([email or web address]).Performed By: [facility] [place] [address] [id number]   Director: Kat Adhikari MD      Total Protein, Electrophoresis 05/19/2022 7.4  6.3 - 8.2 g/dL Final    Albumin, Electrophoresis 05/19/2022 4.05  3.75 - 5.01 g/dL Final    Alpha-1-Globulin 05/19/2022 0.44  0.19 - 0.46 g/dL Final    Alpha-2-Globulin 05/19/2022 0.90  0.48 - 1.05 g/dL Final    Beta Globulin 05/19/2022 1.01  0.48 - 1.10 g/dL Final    Gamma Globulin 05/19/2022 0.99  0.62 - 1.51 g/dL Final    Immunofix Interp. 05/19/2022 SEE NOTE   Final    Comment: Restriction of protein migration in  the gamma region.  IFEgel shows very faint bands in IgG kappa and IgG lambdasuggestive of a specific immune response or earlymonoclonal proteins.  Close clinical correlation with IFEfollow-up is suggested, if   clinically indicated.      EER Protein Electrophoresis 05/19/2022 SEE NOTE   Final    Authorized individuals can access the [facility] Report using the following link:[email or web address] By: [email or web address] [place] [address] [id number] Director: Kat Adhikari MD        No results found in the last 30 days.     All diagnostic data (labs/imaging) was reviewed with the patient and/or family member in the room.  All questions were answered to their liking. The patient and/or family member voiced understanding of all instructions provided. Expectations regarding follow up and treatment plan were voiced and confirmed prior to departure. The patient was given orders/instructions at the end of the visit for reference. They were instructed to notify my office if they have not been contacted for imaging/referrals/labs/results in 1-2 weeks. They voiced understanding of all of the above.     Follow up:     There are no Patient Instructions on file for this visit.     Follow up in about 5 months (around 2/1/2023), or if symptoms worsen or fail to improve.

## 2022-08-31 ENCOUNTER — OFFICE VISIT (OUTPATIENT)
Dept: HEMATOLOGY/ONCOLOGY | Facility: CLINIC | Age: 82
End: 2022-08-31
Payer: MEDICARE

## 2022-08-31 VITALS
RESPIRATION RATE: 15 BRPM | OXYGEN SATURATION: 97 % | HEART RATE: 60 BPM | BODY MASS INDEX: 34.49 KG/M2 | HEIGHT: 64 IN | SYSTOLIC BLOOD PRESSURE: 153 MMHG | DIASTOLIC BLOOD PRESSURE: 70 MMHG | WEIGHT: 202 LBS

## 2022-08-31 DIAGNOSIS — D47.2 MGUS (MONOCLONAL GAMMOPATHY OF UNKNOWN SIGNIFICANCE): Primary | ICD-10-CM

## 2022-08-31 LAB
ALBUMIN SERPL BCP-MCNC: 3.6 G/DL (ref 3.4–5)
ALBUMIN/GLOBULIN RATIO: 0.86 RATIO (ref 1.1–1.8)
ALP SERPL-CCNC: 97 U/L (ref 46–116)
ALT SERPL W P-5'-P-CCNC: 20 U/L (ref 12–78)
ANION GAP SERPL CALC-SCNC: 10 MMOL/L (ref 3–11)
AST SERPL-CCNC: 16 U/L (ref 15–37)
BASOPHILS NFR BLD: 0.6 % (ref 0–3)
BILIRUB SERPL-MCNC: 0.4 MG/DL (ref 0–1)
BUN SERPL-MCNC: 31 MG/DL (ref 7–18)
BUN/CREAT SERPL: 21.37 RATIO (ref 7–18)
CALCIUM SERPL-MCNC: 8.8 MG/DL (ref 8.8–10.5)
CHLORIDE SERPL-SCNC: 105 MMOL/L (ref 100–108)
CO2 SERPL-SCNC: 27 MMOL/L (ref 21–32)
CREAT SERPL-MCNC: 1.45 MG/DL (ref 0.55–1.02)
EOSINOPHIL NFR BLD: 7 % (ref 1–3)
ERYTHROCYTE [DISTWIDTH] IN BLOOD BY AUTOMATED COUNT: 14.1 % (ref 12.5–18)
GFR ESTIMATION: 35
GLOBULIN: 4.2 G/DL (ref 2.3–3.5)
GLUCOSE SERPL-MCNC: 107 MG/DL (ref 70–110)
HCT VFR BLD AUTO: 42.7 % (ref 37–47)
HGB BLD-MCNC: 13.3 G/DL (ref 12–16)
LDH SERPL L TO P-CCNC: 155 U/L (ref 82–234)
LYMPHOCYTES NFR BLD: 18 % (ref 25–40)
MCH RBC QN AUTO: 29.8 PG (ref 27–31.2)
MCHC RBC AUTO-ENTMCNC: 31.1 G/DL (ref 31.8–35.4)
MCV RBC AUTO: 95.5 FL (ref 80–97)
MONOCYTES NFR BLD: 8.7 % (ref 1–15)
NEUTROPHILS # BLD AUTO: 4.58 10*3/UL (ref 1.8–7.7)
NEUTROPHILS NFR BLD: 65.4 % (ref 37–80)
NUCLEATED RED BLOOD CELLS: 0 %
PLATELETS: 242 10*3/UL (ref 142–424)
POTASSIUM SERPL-SCNC: 3.8 MMOL/L (ref 3.6–5.2)
PROT SERPL-MCNC: 7.8 G/DL (ref 6.4–8.2)
RBC # BLD AUTO: 4.47 10*6/UL (ref 4.2–5.4)
SODIUM BLD-SCNC: 142 MMOL/L (ref 135–145)
SPECIMEN TO PATHOLOGY: NORMAL
WBC # BLD: 7 10*3/UL (ref 4.6–10.2)

## 2022-08-31 PROCEDURE — 99205 PR OFFICE/OUTPT VISIT, NEW, LEVL V, 60-74 MIN: ICD-10-PCS | Mod: S$GLB,,, | Performed by: INTERNAL MEDICINE

## 2022-08-31 PROCEDURE — 99205 OFFICE O/P NEW HI 60 MIN: CPT | Mod: S$GLB,,, | Performed by: INTERNAL MEDICINE

## 2022-08-31 NOTE — PROGRESS NOTES
HEMATOLOGY INITIAL CONSULTATION NOTE    Patient ID: Iman Wood is a 81 y.o. female.    Chief Complaint: MGUS    HPI: Patient is a 81 year old female with PMH of HTN, DM, CKD Stage 3b, h/o abrupt loss of vision in Oct 21 who is referred by Nephrology for evaluation of worsening kidney function and work-up revealing IgG kappa monoclonal gammopathy. Voices no acute complaints. Presents to our clinic today for further evaluation. Reports arthralgias and bone pain.           Past Medical History:   Diagnosis Date    Eye degeneration     left    Hypertension        Family History   Problem Relation Age of Onset    Heart disease Mother     Heart disease Father     Cancer Sister     Cancer Son        Social History     Socioeconomic History    Marital status:    Occupational History    Occupation: Retired RN   Tobacco Use    Smoking status: Never    Smokeless tobacco: Never   Substance and Sexual Activity    Alcohol use: Never    Drug use: Not Currently    Sexual activity: Not Currently         Past Surgical History:   Procedure Laterality Date    DILATION AND CURETTAGE OF UTERUS      EYE SURGERY      HYSTERECTOMY      TOTAL KNEE ARTHROPLASTY Bilateral        Review of systems:  Review of Systems   Constitutional:  Negative for activity change, appetite change, chills, diaphoresis, fatigue and unexpected weight change.   HENT:  Negative for congestion, facial swelling, hearing loss, mouth sores, trouble swallowing and voice change.    Eyes:  Positive for visual disturbance. Negative for photophobia, pain, discharge and itching.   Respiratory:  Negative for apnea, cough, choking, chest tightness and shortness of breath.    Cardiovascular:  Negative for chest pain, palpitations and leg swelling.   Gastrointestinal:  Negative for abdominal distention, abdominal pain, anal bleeding and blood in stool.   Endocrine: Negative for cold intolerance, heat intolerance, polydipsia and polyphagia.   Genitourinary:  Negative for  difficulty urinating, dysuria, flank pain and hematuria.   Musculoskeletal:  Positive for arthralgias. Negative for back pain, joint swelling, myalgias, neck pain and neck stiffness.   Skin:  Negative for color change, pallor and wound.   Allergic/Immunologic: Negative for environmental allergies, food allergies and immunocompromised state.   Neurological:  Negative for dizziness, seizures, facial asymmetry, speech difficulty, light-headedness, numbness and headaches.   Hematological:  Negative for adenopathy. Does not bruise/bleed easily.   Psychiatric/Behavioral:  Negative for agitation, behavioral problems, confusion, decreased concentration and sleep disturbance.                                Physical Exam  Vitals and nursing note reviewed.   Constitutional:       General: She is not in acute distress.     Appearance: Normal appearance. She is not ill-appearing.   HENT:      Head: Normocephalic and atraumatic.      Nose: No congestion or rhinorrhea.   Eyes:      General: No scleral icterus.     Extraocular Movements: Extraocular movements intact.      Pupils: Pupils are equal, round, and reactive to light.      Comments: +ve for chronic Eye and eyelid changes   Cardiovascular:      Rate and Rhythm: Normal rate and regular rhythm.      Pulses: Normal pulses.      Heart sounds: Normal heart sounds. No murmur heard.    No gallop.   Pulmonary:      Effort: Pulmonary effort is normal. No respiratory distress.      Breath sounds: Normal breath sounds. No stridor. No wheezing or rhonchi.   Abdominal:      General: Bowel sounds are normal. There is no distension.      Palpations: There is no mass.      Tenderness: There is no abdominal tenderness. There is no guarding.   Musculoskeletal:         General: No swelling, tenderness, deformity or signs of injury. Normal range of motion.      Cervical back: Normal range of motion and neck supple. No rigidity. No muscular tenderness.      Right lower leg: No edema.      Left  lower leg: No edema.   Skin:     General: Skin is warm.      Coloration: Skin is not jaundiced or pale.      Findings: No bruising or lesion.   Neurological:      General: No focal deficit present.      Mental Status: She is alert and oriented to person, place, and time.      Cranial Nerves: No cranial nerve deficit.      Sensory: No sensory deficit.      Motor: No weakness.      Gait: Gait normal.   Psychiatric:         Mood and Affect: Mood normal.         Behavior: Behavior normal.         Thought Content: Thought content normal.     Vitals:    08/31/22 1019   BP: (!) 153/70   Pulse: 60   Resp: 15   Body surface area is 2.03 meters squared.    Assessment/Plan:      1. MGUS (monoclonal gammopathy of unknown significance)       == Will initiate work-up for MGUS vs Multiple myeloma with serum and urine protein electrophoresis, TANIA and also obtain PET scan to r/o lytic lesions which could be attributing to her bone pain.  == Will defer bone marrow biopsy until we have the above investigations back   == Discussed with her in detail future management options giving her opportunity to ask questions. No hypercalcemia or anemia at this time noted but patient has bone pain and worsening renal function along with IgG Kappa monoclonal gammopathy at outside labs      Plan:  Multiple Myeloma work up      RTC in 4 weeks for MD ADAM visit for f/up    Pt is instructed to RTC with labs for continued monitoring of treatment as instructed.     Total time spent in counseling and discussion about further management options including relevant lab work, treatment,  prognosis, medications and intended side effects was more than 60 minutes. More than 50 % of the time was spent in counseling and coordination of care.  I spent a total of 60 minutes on the day of the visit.This includes face to face time and non-face to face time preparing to see the patient (eg, review of tests), Obtaining and/or reviewing separately obtained history,  Documenting clinical information in the electronic or other health record, Independently interpreting resultsand communicating results to the patient/family/caregiver, or Care coordination.

## 2022-09-04 LAB
INTERPRETATION UR IFE-IMP: ABNORMAL
Lab: ABNORMAL HR
TOTAL PROTEIN: ABNORMAL
URINE FREE KAPPA EXCRETION/DAY: ABNORMAL MG/D
URINE FREE KAPPA LIGHT CHAINS: 64.06 MG/L (ref 0–32.9)
URINE FREE LAMBDA EXCRETION/DAY: ABNORMAL MG/D
URINE FREE LAMBDA LIGHT CHAINS: 10.55 MG/L (ref 0–3.79)
VOLUME: ABNORMAL ML

## 2022-09-08 ENCOUNTER — TELEPHONE (OUTPATIENT)
Dept: HEMATOLOGY/ONCOLOGY | Facility: CLINIC | Age: 82
End: 2022-09-08
Payer: MEDICARE

## 2022-09-09 DIAGNOSIS — D47.2 MGUS (MONOCLONAL GAMMOPATHY OF UNKNOWN SIGNIFICANCE): Primary | ICD-10-CM

## 2022-09-15 ENCOUNTER — OFFICE VISIT (OUTPATIENT)
Dept: HEMATOLOGY/ONCOLOGY | Facility: CLINIC | Age: 82
End: 2022-09-15
Payer: MEDICARE

## 2022-09-15 VITALS
DIASTOLIC BLOOD PRESSURE: 76 MMHG | TEMPERATURE: 98 F | HEART RATE: 75 BPM | WEIGHT: 205 LBS | OXYGEN SATURATION: 95 % | RESPIRATION RATE: 16 BRPM | BODY MASS INDEX: 35 KG/M2 | HEIGHT: 64 IN | SYSTOLIC BLOOD PRESSURE: 124 MMHG

## 2022-09-15 DIAGNOSIS — D47.2 MGUS (MONOCLONAL GAMMOPATHY OF UNKNOWN SIGNIFICANCE): Primary | ICD-10-CM

## 2022-09-15 PROCEDURE — 99214 PR OFFICE/OUTPT VISIT, EST, LEVL IV, 30-39 MIN: ICD-10-PCS | Mod: S$GLB,,, | Performed by: INTERNAL MEDICINE

## 2022-09-15 PROCEDURE — 99214 OFFICE O/P EST MOD 30 MIN: CPT | Mod: S$GLB,,, | Performed by: INTERNAL MEDICINE

## 2022-09-15 NOTE — PROGRESS NOTES
HEMATOLOGY F/UP CONSULTATION NOTE    Patient ID: Iman Wood is a 81 y.o. female.    Chief Complaint: MGUS    HPI: Patient is a 81 year old female with PMH of HTN, DM, CKD Stage 3b, h/o abrupt loss of vision in Oct 21 who is referred by Nephrology for evaluation of worsening kidney function and work-up revealing IgG kappa monoclonal gammopathy. Voices no acute complaints. Presents to our clinic today for further evaluation. Reports arthralgias and bone pain.         Past Medical History:   Diagnosis Date    Eye degeneration     left    Hypertension        Family History   Problem Relation Age of Onset    Heart disease Mother     Heart disease Father     Cancer Sister     Cancer Son        Social History     Socioeconomic History    Marital status:    Occupational History    Occupation: Retired RN   Tobacco Use    Smoking status: Never    Smokeless tobacco: Never   Substance and Sexual Activity    Alcohol use: Never    Drug use: Not Currently    Sexual activity: Not Currently         Past Surgical History:   Procedure Laterality Date    DILATION AND CURETTAGE OF UTERUS      EYE SURGERY      HYSTERECTOMY      TOTAL KNEE ARTHROPLASTY Bilateral        Review of systems:  Review of Systems   Constitutional:  Negative for activity change, appetite change, chills, diaphoresis, fatigue and unexpected weight change.   HENT:  Negative for congestion, facial swelling, hearing loss, mouth sores, trouble swallowing and voice change.    Eyes:  Positive for visual disturbance. Negative for photophobia, pain, discharge and itching.   Respiratory:  Negative for apnea, cough, choking, chest tightness and shortness of breath.    Cardiovascular:  Negative for chest pain, palpitations and leg swelling.   Gastrointestinal:  Negative for abdominal distention, abdominal pain, anal bleeding and blood in stool.   Endocrine: Negative for cold intolerance, heat intolerance, polydipsia and polyphagia.   Genitourinary:  Negative for  difficulty urinating, dysuria, flank pain and hematuria.   Musculoskeletal:  Positive for arthralgias. Negative for back pain, joint swelling, myalgias, neck pain and neck stiffness.   Skin:  Negative for color change, pallor and wound.   Allergic/Immunologic: Negative for environmental allergies, food allergies and immunocompromised state.   Neurological:  Negative for dizziness, seizures, facial asymmetry, speech difficulty, light-headedness, numbness and headaches.   Hematological:  Negative for adenopathy. Does not bruise/bleed easily.   Psychiatric/Behavioral:  Negative for agitation, behavioral problems, confusion, decreased concentration and sleep disturbance.                                Physical Exam  Vitals and nursing note reviewed.   Constitutional:       General: She is not in acute distress.     Appearance: Normal appearance. She is not ill-appearing.   HENT:      Head: Normocephalic and atraumatic.      Nose: No congestion or rhinorrhea.   Eyes:      General: No scleral icterus.     Extraocular Movements: Extraocular movements intact.      Pupils: Pupils are equal, round, and reactive to light.      Comments: +ve for chronic Eye and eyelid changes   Cardiovascular:      Rate and Rhythm: Normal rate and regular rhythm.      Pulses: Normal pulses.      Heart sounds: Normal heart sounds. No murmur heard.    No gallop.   Pulmonary:      Effort: Pulmonary effort is normal. No respiratory distress.      Breath sounds: Normal breath sounds. No stridor. No wheezing or rhonchi.   Abdominal:      General: Bowel sounds are normal. There is no distension.      Palpations: There is no mass.      Tenderness: There is no abdominal tenderness. There is no guarding.   Musculoskeletal:         General: No swelling, tenderness, deformity or signs of injury. Normal range of motion.      Cervical back: Normal range of motion and neck supple. No rigidity. No muscular tenderness.      Right lower leg: No edema.      Left  lower leg: No edema.   Skin:     General: Skin is warm.      Coloration: Skin is not jaundiced or pale.      Findings: No bruising or lesion.   Neurological:      General: No focal deficit present.      Mental Status: She is alert and oriented to person, place, and time.      Cranial Nerves: No cranial nerve deficit.      Sensory: No sensory deficit.      Motor: No weakness.      Gait: Gait normal.   Psychiatric:         Mood and Affect: Mood normal.         Behavior: Behavior normal.         Thought Content: Thought content normal.     Vitals:    09/15/22 0844   BP: 124/76   Pulse: 75   Resp: 16   Temp: 97.8 °F (36.6 °C)   Body surface area is 2.05 meters squared.    Assessment/Plan:        MGUS:     == Will initiate work-up for MGUS vs Multiple myeloma with serum and urine protein electrophoresis, TANIA and also obtain PET scan to r/o lytic lesions which could be attributing to her bone pain.  == Will defer bone marrow biopsy until we have the above investigations back   == Discussed with her in detail future management options giving her opportunity to ask questions. No hypercalcemia or anemia at this time noted but patient has bone pain and worsening renal function along with IgG Kappa monoclonal gammopathy at outside labs.  == 9/15/22:  PET scan showed mild uptake along the spinous process of L4 and L5 which appear to be related to osteoarthritis changes.  Uptake is seen around bilateral knee prosthesis which is related to loosening of prosthesis.  No evidence of osteolytic lesions noted on PET scan.  No evidence of CRAB features secondary to plasma cell dyscrasia.  Immunofixation electrophoresis showed very faint band in IgG kappa and IgG lambda suggestive of early monoclonal protein versus a specific immune response.  I will hence continue with observation only at this time.  Repeat myeloma relevant labs in 4 months      Plan:  Repeat Multiple Myeloma/ MGUS relevant labs in 4 months      RTC in 4 four months  for MD visit for f/up    Pt is instructed to RTC with labs for continued monitoring of treatment as instructed.     Total time spent in counseling and discussion about further management options including relevant lab work, treatment,  prognosis, medications and intended side effects was more than 60 minutes. More than 50 % of the time was spent in counseling and coordination of care.  I spent a total of 60 minutes on the day of the visit.This includes face to face time and non-face to face time preparing to see the patient (eg, review of tests), Obtaining and/or reviewing separately obtained history, Documenting clinical information in the electronic or other health record, Independently interpreting resultsand communicating results to the patient/family/caregiver, or Care coordination.

## 2022-09-18 LAB
ALBUMIN, ELECTROPHORESIS: 3.79
ALPHA1 GLOB FLD ELPH-MCNC: 0.4 G/L
ALPHA2 GLOB FLD ELPH-MCNC: 0.95 G/L
B-GLOBULIN FLD ELPH-MCNC: 1.02 G/L
BETA-2 MICROGLOBULIN: 4.8 MG/L
GAMMA GLOB FLD ELPH-MCNC: 1.04 G/L
IMMUNOFIXATION INTERPRETATION: NORMAL
KAPPA FREE LIGHT CHAINS: 49.58 MG/L (ref 3.3–19.4)
KAPPA/LAMBDA FLC RATIO: 1.52 (ref 0.26–1.65)
LAMBDA LIGHT CHAIN, FREE, SERUM: 32.63 MG/L (ref 5.71–26.3)
TOTAL PROTEIN, ELECTROPHORESIS: 7.2

## 2023-01-06 DIAGNOSIS — D47.2 MGUS (MONOCLONAL GAMMOPATHY OF UNKNOWN SIGNIFICANCE): Primary | ICD-10-CM

## 2023-01-09 LAB
ALBUMIN SERPL BCP-MCNC: 3.2 G/DL (ref 3.4–5)
ALBUMIN/GLOBULIN RATIO: 0.8 RATIO (ref 1.1–1.8)
ALP SERPL-CCNC: 104 U/L (ref 46–116)
ALT SERPL W P-5'-P-CCNC: 16 U/L (ref 12–78)
ANION GAP SERPL CALC-SCNC: 10 MMOL/L (ref 3–11)
AST SERPL-CCNC: 15 U/L (ref 15–37)
BASOPHILS NFR BLD: 0.5 % (ref 0–3)
BILIRUB SERPL-MCNC: 0.4 MG/DL (ref 0–1)
BUN SERPL-MCNC: 39 MG/DL (ref 7–18)
BUN/CREAT SERPL: 23.21 RATIO (ref 7–18)
CALCIUM SERPL-MCNC: 8.6 MG/DL (ref 8.8–10.5)
CHLORIDE SERPL-SCNC: 104 MMOL/L (ref 100–108)
CO2 SERPL-SCNC: 28 MMOL/L (ref 21–32)
CREAT SERPL-MCNC: 1.68 MG/DL (ref 0.55–1.02)
EOSINOPHIL NFR BLD: 8.4 % (ref 1–3)
ERYTHROCYTE [DISTWIDTH] IN BLOOD BY AUTOMATED COUNT: 14.1 % (ref 12.5–18)
GFR ESTIMATION: 29
GLOBULIN: 4 G/DL (ref 2.3–3.5)
GLUCOSE SERPL-MCNC: 121 MG/DL (ref 70–110)
HCT VFR BLD AUTO: 40.8 % (ref 37–47)
HGB BLD-MCNC: 13.1 G/DL (ref 12–16)
LYMPHOCYTES NFR BLD: 30.2 % (ref 25–40)
MCH RBC QN AUTO: 30.1 PG (ref 27–31.2)
MCHC RBC AUTO-ENTMCNC: 32.1 G/DL (ref 31.8–35.4)
MCV RBC AUTO: 93.8 FL (ref 80–97)
MONOCYTES NFR BLD: 9.2 % (ref 1–15)
NEUTROPHILS # BLD AUTO: 2.8 10*3/UL (ref 1.8–7.7)
NEUTROPHILS NFR BLD: 51.3 % (ref 37–80)
NUCLEATED RED BLOOD CELLS: 0 %
PLATELETS: 182 10*3/UL (ref 142–424)
POTASSIUM SERPL-SCNC: 3.2 MMOL/L (ref 3.6–5.2)
PROT SERPL-MCNC: 7.2 G/DL (ref 6.4–8.2)
RBC # BLD AUTO: 4.35 10*6/UL (ref 4.2–5.4)
SODIUM BLD-SCNC: 142 MMOL/L (ref 135–145)
WBC # BLD: 5.5 10*3/UL (ref 4.6–10.2)

## 2023-01-11 LAB
ALBUMIN, ELECTROPHORESIS: 3.57 G/DL (ref 3.75–5.01)
ALPHA1 GLOB FLD ELPH-MCNC: 0.38 G/DL (ref 0.19–0.46)
ALPHA2 GLOB FLD ELPH-MCNC: 0.9 G/DL (ref 0.48–1.05)
B-GLOBULIN FLD ELPH-MCNC: 0.92 G/DL (ref 0.48–1.1)
EER PROTEIN ELECTROPHORESIS, SERUM: ABNORMAL
GAMMA GLOB FLD ELPH-MCNC: 0.94 G/DL (ref 0.62–1.51)
IGA SERPL-MCNC: 421 MG/DL (ref 68–408)
IGG SERPL-MCNC: 963 MG/DL (ref 768–1632)
IGM: 30 MG/DL (ref 35–263)
INTERPRETATION SERPL IFE-IMP: ABNORMAL
KAPPA FREE LIGHT CHAINS: 63.78 MG/L (ref 3.3–19.4)
KAPPA/LAMBDA FLC RATIO: 1.32 (ref 0.26–1.65)
LAMBDA LIGHT CHAIN, FREE, SERUM: 48.16 MG/L (ref 5.71–26.3)
MONOCLONAL PROTEIN: ABNORMAL G/DL
TOTAL PROTEIN, ELECTROPHORESIS: 6.7 G/DL (ref 6.3–8.2)

## 2023-01-17 RX ORDER — PROMETHAZINE HYDROCHLORIDE AND DEXTROMETHORPHAN HYDROBROMIDE 6.25; 15 MG/5ML; MG/5ML
5 SYRUP ORAL EVERY 6 HOURS PRN
Qty: 118 ML | Refills: 0 | Status: SHIPPED | OUTPATIENT
Start: 2023-01-17 | End: 2023-01-27

## 2023-01-17 NOTE — TELEPHONE ENCOUNTER
----- Message from Tanisha Garibay sent at 1/17/2023 11:14 AM CST -----  Type:  Needs Medical Advice    Who Called: Iman Wood    Symptoms (please be specific): -   How long has patient had these symptoms:  -  Pharmacy name and phone #:  -  Would the patient rather a call back or a response via [facility]?    Best Call Back Number: [phone]    Additional Information: pt would like to have something called out for a cough, please send something to pharmacy

## 2023-01-19 ENCOUNTER — OFFICE VISIT (OUTPATIENT)
Dept: HEMATOLOGY/ONCOLOGY | Facility: CLINIC | Age: 83
End: 2023-01-19
Payer: MEDICARE

## 2023-01-19 VITALS
RESPIRATION RATE: 15 BRPM | SYSTOLIC BLOOD PRESSURE: 121 MMHG | TEMPERATURE: 99 F | DIASTOLIC BLOOD PRESSURE: 68 MMHG | OXYGEN SATURATION: 98 % | BODY MASS INDEX: 36.19 KG/M2 | WEIGHT: 212 LBS | HEIGHT: 64 IN | HEART RATE: 69 BPM

## 2023-01-19 DIAGNOSIS — D47.2 MGUS (MONOCLONAL GAMMOPATHY OF UNKNOWN SIGNIFICANCE): Primary | ICD-10-CM

## 2023-01-19 DIAGNOSIS — I70.0 AORTIC ATHEROSCLEROSIS: ICD-10-CM

## 2023-01-19 DIAGNOSIS — N18.32 STAGE 3B CHRONIC KIDNEY DISEASE: ICD-10-CM

## 2023-01-19 DIAGNOSIS — D32.9 BENIGN NEOPLASM OF MENINGES: ICD-10-CM

## 2023-01-19 DIAGNOSIS — E66.01 CLASS 2 SEVERE OBESITY DUE TO EXCESS CALORIES WITH SERIOUS COMORBIDITY AND BODY MASS INDEX (BMI) OF 35.0 TO 35.9 IN ADULT: ICD-10-CM

## 2023-01-19 PROBLEM — E66.812 CLASS 2 SEVERE OBESITY DUE TO EXCESS CALORIES WITH SERIOUS COMORBIDITY AND BODY MASS INDEX (BMI) OF 35.0 TO 35.9 IN ADULT: Status: ACTIVE | Noted: 2023-01-19

## 2023-01-19 PROCEDURE — 99214 OFFICE O/P EST MOD 30 MIN: CPT | Mod: S$GLB,,, | Performed by: INTERNAL MEDICINE

## 2023-01-19 PROCEDURE — 99214 PR OFFICE/OUTPT VISIT, EST, LEVL IV, 30-39 MIN: ICD-10-PCS | Mod: S$GLB,,, | Performed by: INTERNAL MEDICINE

## 2023-01-19 NOTE — PROGRESS NOTES
HEMATOLOGY F/UP CONSULTATION NOTE    Patient ID: Imna Wood is a 82 y.o. female.    Chief Complaint: MGUS    HPI: Patient is a 81 year old female with PMH of HTN, DM, CKD Stage 3b, h/o abrupt loss of vision in Oct 21 who is referred by Nephrology for evaluation of worsening kidney function and work-up revealing IgG kappa monoclonal gammopathy. Voices no acute complaints. Presents to our clinic today for further evaluation. Reports arthralgias and bone pain.         Past Medical History:   Diagnosis Date    Eye degeneration     left    Hypertension        Family History   Problem Relation Age of Onset    Heart disease Mother     Heart disease Father     Cancer Sister     Cancer Son        Social History     Socioeconomic History    Marital status:    Occupational History    Occupation: Retired RN   Tobacco Use    Smoking status: Never    Smokeless tobacco: Never   Substance and Sexual Activity    Alcohol use: Never    Drug use: Not Currently    Sexual activity: Not Currently         Past Surgical History:   Procedure Laterality Date    DILATION AND CURETTAGE OF UTERUS      EYE SURGERY      HYSTERECTOMY      TOTAL KNEE ARTHROPLASTY Bilateral        Review of systems:  Review of Systems   Constitutional:  Negative for activity change, appetite change, chills, diaphoresis, fatigue and unexpected weight change.   HENT:  Negative for congestion, facial swelling, hearing loss, mouth sores, trouble swallowing and voice change.    Eyes:  Positive for visual disturbance. Negative for photophobia, pain, discharge and itching.   Respiratory:  Negative for apnea, cough, choking, chest tightness and shortness of breath.    Cardiovascular:  Negative for chest pain, palpitations and leg swelling.   Gastrointestinal:  Negative for abdominal distention, abdominal pain, anal bleeding and blood in stool.   Endocrine: Negative for cold intolerance, heat intolerance, polydipsia and polyphagia.   Genitourinary:  Negative for  difficulty urinating, dysuria, flank pain and hematuria.   Musculoskeletal:  Positive for arthralgias. Negative for back pain, joint swelling, myalgias, neck pain and neck stiffness.   Skin:  Negative for color change, pallor and wound.   Allergic/Immunologic: Negative for environmental allergies, food allergies and immunocompromised state.   Neurological:  Negative for dizziness, seizures, facial asymmetry, speech difficulty, light-headedness, numbness and headaches.   Hematological:  Negative for adenopathy. Does not bruise/bleed easily.   Psychiatric/Behavioral:  Negative for agitation, behavioral problems, confusion, decreased concentration and sleep disturbance.                                Physical Exam  Vitals and nursing note reviewed.   Constitutional:       General: She is not in acute distress.     Appearance: Normal appearance. She is not ill-appearing.   HENT:      Head: Normocephalic and atraumatic.      Nose: No congestion or rhinorrhea.   Eyes:      General: No scleral icterus.     Extraocular Movements: Extraocular movements intact.      Pupils: Pupils are equal, round, and reactive to light.      Comments: +ve for chronic Eye and eyelid changes   Cardiovascular:      Rate and Rhythm: Normal rate and regular rhythm.      Pulses: Normal pulses.      Heart sounds: Normal heart sounds. No murmur heard.    No gallop.   Pulmonary:      Effort: Pulmonary effort is normal. No respiratory distress.      Breath sounds: Normal breath sounds. No stridor. No wheezing or rhonchi.   Abdominal:      General: Bowel sounds are normal. There is no distension.      Palpations: There is no mass.      Tenderness: There is no abdominal tenderness. There is no guarding.   Musculoskeletal:         General: No swelling, tenderness, deformity or signs of injury. Normal range of motion.      Cervical back: Normal range of motion and neck supple. No rigidity. No muscular tenderness.      Right lower leg: No edema.      Left  lower leg: No edema.   Skin:     General: Skin is warm.      Coloration: Skin is not jaundiced or pale.      Findings: No bruising or lesion.   Neurological:      General: No focal deficit present.      Mental Status: She is alert and oriented to person, place, and time.      Cranial Nerves: No cranial nerve deficit.      Sensory: No sensory deficit.      Motor: No weakness.      Gait: Gait normal.   Psychiatric:         Mood and Affect: Mood normal.         Behavior: Behavior normal.         Thought Content: Thought content normal.     There were no vitals filed for this visit.  There is no height or weight on file to calculate BSA.    Assessment/Plan:        MGUS:     == Will initiate work-up for MGUS vs Multiple myeloma with serum and urine protein electrophoresis, TANIA and also obtain PET scan to r/o lytic lesions which could be attributing to her bone pain.  == Will defer bone marrow biopsy until we have the above investigations back   == Discussed with her in detail future management options giving her opportunity to ask questions. No hypercalcemia or anemia at this time noted but patient has bone pain and worsening renal function along with IgG Kappa monoclonal gammopathy at outside labs.  == 9/15/22:  PET scan showed mild uptake along the spinous process of L4 and L5 which appear to be related to osteoarthritis changes.  Uptake is seen around bilateral knee prosthesis which is related to loosening of prosthesis.  No evidence of osteolytic lesions noted on PET scan.  No evidence of CRAB features secondary to plasma cell dyscrasia.  Immunofixation electrophoresis showed very faint band in IgG kappa and IgG lambda suggestive of early monoclonal protein versus a specific immune response.  I will hence continue with observation only at this time.  Repeat myeloma relevant labs in 4 months.  == 1/19/23:  Worsening of kappa free light chains but normal kappa lambda free light chain ratio.  No evidence of  hypercalcemia.  Mild worsening and fluctuation in kidney function which I do not feel is related to multiple myeloma related proteins.  Will hence continue with observation only.  Advised to continue to follow-up with PCP for chronic kidney disease.      Plan:  Continue observation  Repeat Multiple Myeloma/ MGUS relevant labs in 4 months      RTC in 4 four months for MD visit for f/up    Pt is instructed to RTC with labs for continued monitoring of treatment as instructed.     Total time spent in counseling and discussion about further management options including relevant lab work, treatment,  prognosis, medications and intended side effects was more than 25 minutes. More than 50 % of the time was spent in counseling and coordination of care.  I spent a total of 25 minutes on the day of the visit.This includes face to face time and non-face to face time preparing to see the patient (eg, review of tests), Obtaining and/or reviewing separately obtained history, Documenting clinical information in the electronic or other health record, Independently interpreting resultsand communicating results to the patient/family/caregiver, or Care coordination.

## 2023-01-23 ENCOUNTER — OFFICE VISIT (OUTPATIENT)
Dept: FAMILY MEDICINE | Facility: CLINIC | Age: 83
End: 2023-01-23
Payer: MEDICARE

## 2023-01-23 VITALS
RESPIRATION RATE: 18 BRPM | SYSTOLIC BLOOD PRESSURE: 120 MMHG | OXYGEN SATURATION: 95 % | WEIGHT: 208 LBS | BODY MASS INDEX: 35.51 KG/M2 | DIASTOLIC BLOOD PRESSURE: 62 MMHG | TEMPERATURE: 98 F | HEIGHT: 64 IN

## 2023-01-23 DIAGNOSIS — N18.32 STAGE 3B CHRONIC KIDNEY DISEASE: Chronic | ICD-10-CM

## 2023-01-23 DIAGNOSIS — R55 SYNCOPE, UNSPECIFIED SYNCOPE TYPE: Primary | ICD-10-CM

## 2023-01-23 DIAGNOSIS — D47.2 MGUS (MONOCLONAL GAMMOPATHY OF UNKNOWN SIGNIFICANCE): ICD-10-CM

## 2023-01-23 DIAGNOSIS — J20.9 SUBACUTE BRONCHITIS: ICD-10-CM

## 2023-01-23 DIAGNOSIS — E66.01 CLASS 2 SEVERE OBESITY DUE TO EXCESS CALORIES WITH SERIOUS COMORBIDITY AND BODY MASS INDEX (BMI) OF 35.0 TO 35.9 IN ADULT: Chronic | ICD-10-CM

## 2023-01-23 DIAGNOSIS — I10 HYPERTENSION, UNSPECIFIED TYPE: Chronic | ICD-10-CM

## 2023-01-23 DIAGNOSIS — H47.011 ISCHEMIC OPTIC NEUROPATHY OF RIGHT EYE: ICD-10-CM

## 2023-01-23 DIAGNOSIS — J30.2 SEASONAL ALLERGIES: Chronic | ICD-10-CM

## 2023-01-23 DIAGNOSIS — I70.0 AORTIC ATHEROSCLEROSIS: Chronic | ICD-10-CM

## 2023-01-23 PROCEDURE — 99214 OFFICE O/P EST MOD 30 MIN: CPT | Mod: S$GLB,,, | Performed by: NURSE PRACTITIONER

## 2023-01-23 PROCEDURE — 99214 PR OFFICE/OUTPT VISIT, EST, LEVL IV, 30-39 MIN: ICD-10-PCS | Mod: S$GLB,,, | Performed by: NURSE PRACTITIONER

## 2023-01-23 RX ORDER — MONTELUKAST SODIUM 10 MG/1
10 TABLET ORAL NIGHTLY
Qty: 30 TABLET | Refills: 0 | Status: SHIPPED | OUTPATIENT
Start: 2023-01-23 | End: 2023-02-15

## 2023-01-23 NOTE — PROGRESS NOTES
Subjective:       Patient ID: Iman Wood is a 82 y.o. female.    Chief Complaint: Dizziness (Pt is here for dizziness. Pt states she has been passing out a lot lately. Pt states when she walks and stands, she gets dizzy. 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 She has PMH HTN, atherosclerosis aorta, MGUS, depression/anxiety (following death of her son), HLD, gouty arthropathy, macular degeneration, and congenital ptosis right upper eyelid.    Recently had what her ophthalmologist suggest anterior ischemic optic neuropathy Rt eye. She is established w/ Dr Hutton at [facility] and retinal specialist Dr Jeong.     She reports 2 episodes of passing out. She passed out while at a store with her friend. She was walking and passed out abruptly. She was told she was out for approx 10 sec before she came to. This occurred again 2 weeks ago while walking with her daughter. She was leaving her hematology appt. She felt sudden rush of heat while walking. She got into passenger side of car. Her daughter said she slumped over and passed out for approx 10 seconds before coming to. No confusion following syncopal events. No other associated symptoms including palpitations or SOB. No dizziness or lightheartedness before passing out.     Cough for approx 3-4 weeks. Taking Otc cough/congestion medication  sinus/congestion meds-- Mucinex. She takes promethazine DM at bedtime. Mild active seasonal allergies. No fever. She describes sputum and clear and foamy.                 Review of Systems   Constitutional:  Positive for fatigue. Negative for chills and fever.   HENT:  Positive for postnasal drip and rhinorrhea. Negative for congestion and sore throat.    Respiratory:  Positive for cough. Negative for chest tightness and shortness of breath.    Cardiovascular:  Negative for chest pain and palpitations.   Gastrointestinal:  Negative for abdominal  pain, nausea and vomiting.   Genitourinary:  Negative for hematuria.   Skin:  Negative for color change and rash.   Neurological:  Positive for syncope. Negative for dizziness, seizures, speech difficulty, weakness, light-headedness and headaches.   Psychiatric/Behavioral:  Negative for dysphoric mood and sleep disturbance. The patient is not nervous/anxious.          Past Medical History:  Past Medical History:   Diagnosis Date    Eye degeneration     left    Hypertension       Past Surgical History:   Procedure Laterality Date    DILATION AND CURETTAGE OF UTERUS      EYE SURGERY      HYSTERECTOMY      TOTAL KNEE ARTHROPLASTY Bilateral       Review of patient's allergies indicates:   Allergen Reactions    Meperidine Nausea Only      Current Outpatient Medications   Medication Sig Dispense Refill    amLODIPine (NORVASC) 10 MG tablet TAKE 1 TABLET BY MOUTH EVERY DAY 90 tablet 3    atorvastatin (LIPITOR) 20 MG tablet TAKE 1 TABLET BY MOUTH EVERYDAY AT BEDTIME 90 tablet 3    citalopram (CELEXA) 20 MG tablet TAKE 1 TABLET BY MOUTH EVERY DAY 90 tablet 2    colchicine (COLCRYS) 0.6 mg tablet Take 1 tablet by mouth now and 1 tablet by mouth 1 hour later as directed. 7 tablet 3    ferrous gluconate (FERGON) 324 MG tablet TAKE 1 TABLET BY MOUTH EVERY OTHER DAY WITH BREAKFAST 15 tablet 1    loratadine (CLARITIN) 10 mg tablet TAKE 1 TABLET BY MOUTH EVERY DAY 90 tablet 3    losartan-hydrochlorothiazide 100-12.5 mg (HYZAAR) 100-12.5 mg Tab TAKE 1 TABLET BY MOUTH EVERY DAY 90 tablet 3    metoprolol succinate (TOPROL-XL) 50 MG 24 hr tablet TAKE 1 TABLET BY MOUTH EVERY DAY 90 tablet 3    promethazine-dextromethorphan (PROMETHAZINE-DM) 6.25-15 mg/5 mL Syrp Take 5 mLs by mouth every 6 (six) hours as needed (cough). 118 mL 0    montelukast (SINGULAIR) 10 mg tablet Take 1 tablet (10 mg total) by mouth every evening. 30 tablet 0     No current facility-administered medications for this visit.     Social History     Socioeconomic History     Marital status:    Occupational History    Occupation: Retired RN   Tobacco Use    Smoking status: Never    Smokeless tobacco: Never   Substance and Sexual Activity    Alcohol use: Never    Drug use: Not Currently    Sexual activity: Not Currently      Family History   Problem Relation Age of Onset    Heart disease Mother     Heart disease Father     Cancer Sister     Cancer Son         Objective:      Physical Exam  Constitutional:       Appearance: She is well-developed.   HENT:      Head: Normocephalic and atraumatic.   Eyes:      General: No scleral icterus.     Conjunctiva/sclera: Conjunctivae normal.      Pupils: Pupils are equal, round, and reactive to light.   Neck:      Thyroid: No thyroid mass.      Trachea: Trachea normal.   Cardiovascular:      Rate and Rhythm: Normal rate and regular rhythm.   Pulmonary:      Effort: Pulmonary effort is normal.      Breath sounds: Wheezing (mild expiratory wheeze RUL, clear throughout otherwise) present.   Musculoskeletal:      Cervical back: Normal range of motion and neck supple.   Neurological:      Mental Status: She is alert and oriented to person, place, and time.   Psychiatric:         Mood and Affect: Mood normal.         Behavior: Behavior normal.       Assessment:     1. Syncope, unspecified syncope type Active   2. Hypertension, unspecified type Stable   3. Subacute bronchitis    4. Seasonal allergies Active   5. Stage 3b chronic kidney disease Stable   6. Aortic atherosclerosis Stable   7. Class 2 severe obesity due to excess calories with serious comorbidity and body mass index (BMI) of 35.0 to 35.9 in adult Active   8. MGUS (monoclonal gammopathy of unknown significance)    9. Ischemic optic neuropathy of right eye      Plan:       PROBLEM LIST     Syncope, unspecified syncope type  Comments:  arranging tilt table, carotid US, and 48 hr Holter monitor. Contacted cardiology and they can see her on Feb 1.   Orders:  -     Tilt table; Future  -      US Carotid Bilateral; Future; Expected date: 01/23/2023  -     Holter monitor - 48 hour; Future    Hypertension, unspecified type    Subacute bronchitis  Comments:  She is over the worse of it and on the mend. continue promethazine DM at bedtime; Start Singulair x 4 weeks; increase oral intak H2O     Seasonal allergies  Comments:  eat teaspoon local honey bid for the next 2 weeks; seasonal allergies exacerbating bronchitis.   Orders:  -     montelukast (SINGULAIR) 10 mg tablet; Take 1 tablet (10 mg total) by mouth every evening.  Dispense: 30 tablet; Refill: 0    Stage 3b chronic kidney disease    Aortic atherosclerosis    Class 2 severe obesity due to excess calories with serious comorbidity and body mass index (BMI) of 35.0 to 35.9 in adult  Comments:  exercise limited given her fall risk related to syncopal events; Need to follow heart healthy diet; focus on fruits/veg/whole grains/lean proteins    MGUS (monoclonal gammopathy of unknown significance)  Comments:  now established w/ Dr Barksdale    Ischemic optic neuropathy of right eye  Comments:  followed by Dr Hutton and Dr Jeong

## 2023-01-26 DIAGNOSIS — R55 SYNCOPE AND COLLAPSE: Primary | ICD-10-CM

## 2023-01-30 ENCOUNTER — TELEPHONE (OUTPATIENT)
Dept: FAMILY MEDICINE | Facility: CLINIC | Age: 83
End: 2023-01-30
Payer: MEDICARE

## 2023-01-30 NOTE — TELEPHONE ENCOUNTER
----- Message from Tanisha Garibay sent at 1/30/2023  8:48 AM CST -----  Type:  Needs Medical Advice    Who Called: Maddison pike/ [facility]   Symptoms (please be specific): -   How long has patient had these symptoms:  -  Pharmacy name and phone #:  -  Would the patient rather a call back or a response via [facility]?    Best Call Back Number: [phone]  Additional Information: Needs to speak w/ you about a tilt table order, would you like nitro glycerin given during the test?, please call to advise or please fax order to scheduling dept

## 2023-03-03 ENCOUNTER — TELEPHONE (OUTPATIENT)
Dept: FAMILY MEDICINE | Facility: CLINIC | Age: 83
End: 2023-03-03
Payer: MEDICARE

## 2023-03-03 DIAGNOSIS — M10.9 GOUT, ARTHROPATHY: ICD-10-CM

## 2023-03-03 RX ORDER — COLCHICINE 0.6 MG/1
TABLET ORAL
Qty: 12 TABLET | Refills: 3 | Status: SHIPPED | OUTPATIENT
Start: 2023-03-03

## 2023-03-03 NOTE — TELEPHONE ENCOUNTER
----- Message from Harika Lou sent at 3/3/2023  8:16 AM CST -----  Contact: Iman  Type:  Needs Medical Advice    Who Called:  Iman  Symptoms (please be specific): Gout attack  How long has patient had these symptoms: About three days   Pharmacy name and phone #:    [id number] - [place], [place] - [address] AT [facility]  [address]  [place] [address]  Phone: [phone] Fax: [phone]  Would the patient rather a call back or a response via [facility]? Callback   Best Call Back Number:  Please call her at [phone]  Additional Information:  She stated that she is taken Ibuprofen 250 mg and its not touching the pain

## 2023-03-09 ENCOUNTER — OFFICE VISIT (OUTPATIENT)
Dept: FAMILY MEDICINE | Facility: CLINIC | Age: 83
End: 2023-03-09
Payer: MEDICARE

## 2023-03-09 ENCOUNTER — TELEPHONE (OUTPATIENT)
Dept: FAMILY MEDICINE | Facility: CLINIC | Age: 83
End: 2023-03-09
Payer: MEDICARE

## 2023-03-09 ENCOUNTER — TELEPHONE (OUTPATIENT)
Dept: FAMILY MEDICINE | Facility: CLINIC | Age: 83
End: 2023-03-09

## 2023-03-09 VITALS
HEART RATE: 89 BPM | RESPIRATION RATE: 18 BRPM | BODY MASS INDEX: 35.51 KG/M2 | WEIGHT: 208 LBS | HEIGHT: 64 IN | TEMPERATURE: 97 F | SYSTOLIC BLOOD PRESSURE: 134 MMHG | DIASTOLIC BLOOD PRESSURE: 79 MMHG

## 2023-03-09 DIAGNOSIS — J20.9 ACUTE WHEEZY BRONCHITIS: ICD-10-CM

## 2023-03-09 DIAGNOSIS — R05.1 ACUTE COUGH: Primary | ICD-10-CM

## 2023-03-09 DIAGNOSIS — R06.2 WHEEZE: ICD-10-CM

## 2023-03-09 PROCEDURE — U0002 PR SARS-COV-2 COVID-19 ANY TECHNIQUE, MULT TYPE/SUBTYPE/TARGET: ICD-10-PCS | Mod: QW,CR,S$GLB, | Performed by: NURSE PRACTITIONER

## 2023-03-09 PROCEDURE — 94640 PR INHAL RX, AIRWAY OBST/DX SPUTUM INDUCT: ICD-10-PCS | Mod: S$GLB,,, | Performed by: NURSE PRACTITIONER

## 2023-03-09 PROCEDURE — 87804 POCT INFLUENZA A/B: ICD-10-PCS | Mod: QW,,, | Performed by: NURSE PRACTITIONER

## 2023-03-09 PROCEDURE — 99214 PR OFFICE/OUTPT VISIT, EST, LEVL IV, 30-39 MIN: ICD-10-PCS | Mod: 25,S$GLB,, | Performed by: NURSE PRACTITIONER

## 2023-03-09 PROCEDURE — 99214 OFFICE O/P EST MOD 30 MIN: CPT | Mod: 25,S$GLB,, | Performed by: NURSE PRACTITIONER

## 2023-03-09 PROCEDURE — 87804 INFLUENZA ASSAY W/OPTIC: CPT | Mod: QW,,, | Performed by: NURSE PRACTITIONER

## 2023-03-09 PROCEDURE — 94640 AIRWAY INHALATION TREATMENT: CPT | Mod: S$GLB,,, | Performed by: NURSE PRACTITIONER

## 2023-03-09 PROCEDURE — U0002 COVID-19 LAB TEST NON-CDC: HCPCS | Mod: QW,CR,S$GLB, | Performed by: NURSE PRACTITIONER

## 2023-03-09 RX ORDER — METHYLPREDNISOLONE 4 MG/1
TABLET ORAL
Qty: 21 TABLET | Refills: 0 | Status: SHIPPED | OUTPATIENT
Start: 2023-03-09 | End: 2023-03-23 | Stop reason: ALTCHOICE

## 2023-03-09 RX ORDER — PROMETHAZINE HYDROCHLORIDE AND DEXTROMETHORPHAN HYDROBROMIDE 6.25; 15 MG/5ML; MG/5ML
5 SYRUP ORAL EVERY 4 HOURS PRN
Qty: 240 ML | Refills: 0 | Status: SHIPPED | OUTPATIENT
Start: 2023-03-09 | End: 2023-03-19

## 2023-03-09 RX ORDER — ALBUTEROL SULFATE 90 UG/1
2 AEROSOL, METERED RESPIRATORY (INHALATION) EVERY 6 HOURS PRN
Qty: 8 G | Refills: 1 | Status: SHIPPED | OUTPATIENT
Start: 2023-03-09

## 2023-03-09 RX ORDER — LEVOFLOXACIN 500 MG/1
500 TABLET, FILM COATED ORAL DAILY
Qty: 7 TABLET | Refills: 0 | Status: SHIPPED | OUTPATIENT
Start: 2023-03-09 | End: 2023-03-23 | Stop reason: ALTCHOICE

## 2023-03-09 RX ORDER — IPRATROPIUM BROMIDE AND ALBUTEROL SULFATE 2.5; .5 MG/3ML; MG/3ML
3 SOLUTION RESPIRATORY (INHALATION)
Status: COMPLETED | OUTPATIENT
Start: 2023-03-09 | End: 2023-03-09

## 2023-03-09 RX ORDER — IPRATROPIUM BROMIDE AND ALBUTEROL SULFATE 2.5; .5 MG/3ML; MG/3ML
3 SOLUTION RESPIRATORY (INHALATION) EVERY 6 HOURS PRN
Qty: 360 ML | Refills: 0 | Status: SHIPPED | OUTPATIENT
Start: 2023-03-09 | End: 2023-03-13

## 2023-03-09 RX ADMIN — IPRATROPIUM BROMIDE AND ALBUTEROL SULFATE 3 ML: 2.5; .5 SOLUTION RESPIRATORY (INHALATION) at 03:03

## 2023-03-09 NOTE — TELEPHONE ENCOUNTER
----- Message from Makeda Burnham sent at 3/9/2023 12:01 PM CST -----  Patient would paper work for nebulizer sent to [employer]..Please call at back at [phone]     Has The Growth Been Previously Biopsied?: has been previously biopsied

## 2023-03-09 NOTE — TELEPHONE ENCOUNTER
----- Message from Romina Little sent at 3/9/2023  8:42 AM CST -----  Regarding: same day appt  Contact: Nancy/daughter  Type:  Same Day Appointment Request    Caller is requesting a same day appointment.  Caller declined first available appointment listed below.    Name of Caller: Nancy/daughter   When is the first available appointment? 03/20/23  Symptoms: cough, rasping breathing   Best Call Back Number:[phone]  Additional Information:

## 2023-03-09 NOTE — PROGRESS NOTES
Subjective:       Patient ID: Iman Wood is a 82 y.o. female.    Chief Complaint: Cough (Pt is here for a cough and wheezing. Pt states it has been present since Monday. Pt states she doesn't feel good.)    Started coughing last Monday. Coughing is excessive. She is wheezing. She had sore throat, but this has improved. Feeling fatigued and winded. Some superficial chest wall pain from coughing. No fever that she is aware of.     Review of Systems   Constitutional:  Negative for activity change, appetite change and fever.   HENT:  Positive for rhinorrhea and sinus pressure. Negative for congestion and sore throat.    Respiratory:  Positive for chest tightness, shortness of breath and wheezing.    Cardiovascular:  Negative for chest pain (superficial chest wall) and palpitations.   Gastrointestinal:  Negative for abdominal pain, diarrhea, nausea and vomiting.   Musculoskeletal:  Positive for myalgias.   Skin:  Negative for rash.   Neurological:  Positive for headaches. Negative for dizziness and light-headedness.   Hematological:  Negative for adenopathy.         Past Medical History:  Past Medical History:   Diagnosis Date    Eye degeneration     left    Hypertension       Past Surgical History:   Procedure Laterality Date    DILATION AND CURETTAGE OF UTERUS      EYE SURGERY      HYSTERECTOMY      TOTAL KNEE ARTHROPLASTY Bilateral       Review of patient's allergies indicates:   Allergen Reactions    Meperidine Nausea Only      Current Outpatient Medications   Medication Sig Dispense Refill    amLODIPine (NORVASC) 10 MG tablet TAKE 1 TABLET BY MOUTH EVERY DAY 90 tablet 3    atorvastatin (LIPITOR) 20 MG tablet TAKE 1 TABLET BY MOUTH EVERYDAY AT BEDTIME 90 tablet 3    citalopram (CELEXA) 20 MG tablet TAKE 1 TABLET BY MOUTH EVERY DAY 90 tablet 2    colchicine (COLCRYS) 0.6 mg tablet Take 2 tablet by mouth now and 1 tablet by mouth 1 hour later as directed. Can repeat dose after 3 days prn gout. 12 tablet 3    ferrous  gluconate (FERGON) 324 MG tablet TAKE 1 TABLET BY MOUTH EVERY OTHER DAY WITH BREAKFAST 15 tablet 1    loratadine (CLARITIN) 10 mg tablet TAKE 1 TABLET BY MOUTH EVERY DAY 90 tablet 3    losartan-hydrochlorothiazide 100-12.5 mg (HYZAAR) 100-12.5 mg Tab TAKE 1 TABLET BY MOUTH EVERY DAY 90 tablet 3    metoprolol succinate (TOPROL-XL) 50 MG 24 hr tablet TAKE 1 TABLET BY MOUTH EVERY DAY 90 tablet 3    montelukast (SINGULAIR) 10 mg tablet TAKE 1 TABLET BY MOUTH EVERY DAY IN THE EVENING 30 tablet 2    albuterol (PROVENTIL HFA) 90 mcg/actuation inhaler Inhale 2 puffs into the lungs every 6 (six) hours as needed for Wheezing or Shortness of Breath. Rescue 8 g 1    albuterol-ipratropium (DUO-NEB) 2.5 mg-0.5 mg/3 mL nebulizer solution Take 3 mLs by nebulization every 6 (six) hours as needed for Wheezing or Shortness of Breath. Rescue 360 mL 0    levoFLOXacin (LEVAQUIN) 500 MG tablet Take 1 tablet (500 mg total) by mouth once daily. 7 tablet 0    methylPREDNISolone (MEDROL DOSEPACK) 4 mg tablet use as directed 21 tablet 0    promethazine-dextromethorphan (PROMETHAZINE-DM) 6.25-15 mg/5 mL Syrp Take 5 mLs by mouth every 4 (four) hours as needed (cough). 240 mL 0     No current facility-administered medications for this visit.     Social History     Socioeconomic History    Marital status:    Occupational History    Occupation: Retired RN   Tobacco Use    Smoking status: Never    Smokeless tobacco: Never   Substance and Sexual Activity    Alcohol use: Never    Drug use: Not Currently    Sexual activity: Not Currently      Family History   Problem Relation Age of Onset    Heart disease Mother     Heart disease Father     Cancer Sister     Cancer Son         Objective:      Physical Exam  Constitutional:       Appearance: She is well-developed.   HENT:      Head: Normocephalic and atraumatic.      Nose: Rhinorrhea present. No congestion.   Eyes:      General: No scleral icterus.     Conjunctiva/sclera: Conjunctivae normal.       Pupils: Pupils are equal, round, and reactive to light.   Neck:      Thyroid: No thyroid mass.      Trachea: Trachea normal.   Cardiovascular:      Rate and Rhythm: Normal rate and regular rhythm.   Pulmonary:      Effort: Pulmonary effort is normal.      Breath sounds: Wheezing present.   Neurological:      Mental Status: She is alert and oriented to person, place, and time.   Psychiatric:         Mood and Affect: Mood normal.         Behavior: Behavior normal.       Assessment:     1. Acute cough Acute   2. Wheeze Acute   3. Acute wheezy bronchitis Acute     Plan:       PROBLEM LIST     Acute cough  -     X-Ray Chest PA And Lateral; Future; Expected date: 03/09/2023  -     POCT Influenza A/B Rapid Antigen  -     POCT COVID-19 Rapid Screening    Wheeze  -     X-Ray Chest PA And Lateral; Future; Expected date: 03/09/2023  -     POCT Influenza A/B Rapid Antigen  -     POCT COVID-19 Rapid Screening    Acute wheezy bronchitis  -     levoFLOXacin (LEVAQUIN) 500 MG tablet; Take 1 tablet (500 mg total) by mouth once daily.  Dispense: 7 tablet; Refill: 0  -     promethazine-dextromethorphan (PROMETHAZINE-DM) 6.25-15 mg/5 mL Syrp; Take 5 mLs by mouth every 4 (four) hours as needed (cough).  Dispense: 240 mL; Refill: 0  -     albuterol (PROVENTIL HFA) 90 mcg/actuation inhaler; Inhale 2 puffs into the lungs every 6 (six) hours as needed for Wheezing or Shortness of Breath. Rescue  Dispense: 8 g; Refill: 1  -     methylPREDNISolone (MEDROL DOSEPACK) 4 mg tablet; use as directed  Dispense: 21 tablet; Refill: 0  -     albuterol-ipratropium 2.5 mg-0.5 mg/3 mL nebulizer solution 3 mL  -     albuterol-ipratropium (DUO-NEB) 2.5 mg-0.5 mg/3 mL nebulizer solution; Take 3 mLs by nebulization every 6 (six) hours as needed for Wheezing or Shortness of Breath. Rescue  Dispense: 360 mL; Refill: 0  -     NEBULIZER FOR HOME USE  -     NEBULIZER KIT (SUPPLIES) FOR HOME USE       Flu and covid swab neg   Acute wheezy bronchitis--provided w/  neb treatment in clinic today. Ordering nebulizer unit and supplies from [facility]; recommend using Duoneb solution at least tid for the next week. Start oral steroid, abx, and antitussive.  CXR completed--neg for pneumonia. Call or RTC for persistent or worsening symptoms.       Daughter [phone]

## 2023-03-13 ENCOUNTER — TELEPHONE (OUTPATIENT)
Dept: FAMILY MEDICINE | Facility: CLINIC | Age: 83
End: 2023-03-13
Payer: MEDICARE

## 2023-03-13 DIAGNOSIS — J20.9 ACUTE WHEEZY BRONCHITIS: Primary | ICD-10-CM

## 2023-03-13 LAB
CTP QC/QA: YES
CTP QC/QA: YES
FLUAV AG NPH QL: NEGATIVE
FLUBV AG NPH QL: NEGATIVE
SARS-COV-2 RDRP RESP QL NAA+PROBE: NEGATIVE

## 2023-03-13 RX ORDER — LEVALBUTEROL INHALATION SOLUTION 1.25 MG/3ML
1 SOLUTION RESPIRATORY (INHALATION) EVERY 6 HOURS PRN
Qty: 360 ML | Refills: 1 | Status: SHIPPED | OUTPATIENT
Start: 2023-03-13 | End: 2023-03-14

## 2023-03-13 NOTE — TELEPHONE ENCOUNTER
----- Message from Juanita Sanchez LPN sent at 3/10/2023  1:31 PM CST -----  Regarding: FW: Medication  Contact: Nancy,daughter    ----- Message -----  From: Nargis Kinsey  Sent: 3/10/2023  11:14 AM CST  To: Phyllis [facility] Staff  Subject: Medication                                       Per phone call with Nancy she stated that the prescription that was ordered, albuterol-ipratropium (DUO-NEB) 2.5 mg-0.5 mg/3 mL nebulizer solution that no one in [place] has it.  She wanted to find out is there anything else she should do.  Please return call at [phone].    Thanks,  SJ

## 2023-03-23 ENCOUNTER — OFFICE VISIT (OUTPATIENT)
Dept: FAMILY MEDICINE | Facility: CLINIC | Age: 83
End: 2023-03-23
Payer: MEDICARE

## 2023-03-23 VITALS
HEART RATE: 81 BPM | WEIGHT: 208 LBS | DIASTOLIC BLOOD PRESSURE: 75 MMHG | SYSTOLIC BLOOD PRESSURE: 138 MMHG | HEIGHT: 64 IN | OXYGEN SATURATION: 97 % | TEMPERATURE: 97 F | BODY MASS INDEX: 35.51 KG/M2 | RESPIRATION RATE: 18 BRPM

## 2023-03-23 DIAGNOSIS — J20.9 SUBACUTE BRONCHITIS: Primary | ICD-10-CM

## 2023-03-23 DIAGNOSIS — I10 HYPERTENSION, UNSPECIFIED TYPE: Chronic | ICD-10-CM

## 2023-03-23 DIAGNOSIS — N18.32 STAGE 3B CHRONIC KIDNEY DISEASE: Chronic | ICD-10-CM

## 2023-03-23 PROCEDURE — 99212 OFFICE O/P EST SF 10 MIN: CPT | Mod: S$GLB,,, | Performed by: NURSE PRACTITIONER

## 2023-03-23 PROCEDURE — 99212 PR OFFICE/OUTPT VISIT, EST, LEVL II, 10-19 MIN: ICD-10-PCS | Mod: S$GLB,,, | Performed by: NURSE PRACTITIONER

## 2023-03-23 NOTE — PROGRESS NOTES
Subjective:       Patient ID: Iman Wood is a 82 y.o. female.    Chief Complaint: Follow-up (Pt is here for a 1 week follow up. Pt states her cough has gotten better but she still has some phlegm on her chest. )    Started coughing approx 4 wk ago. Coughing is excessive. She is wheezing. She had sore throat, but this has improved. Feeling fatigued and winded. Some superficial chest wall pain from coughing. No fever that she is aware of. She was diagnosed with acute wheeze bronchitis at our last nori 3/13/22. CXR neg. Started on oral abx, oral steroid, and nebulizer treatments. Initially using Neb tx TID, then moved to BID. She has responded well to treatment.     Review of Systems   Constitutional:  Negative for chills and fever.   Respiratory:  Positive for cough (improved, some productive sputum). Negative for chest tightness and shortness of breath.    Cardiovascular:  Negative for chest pain and palpitations.   Gastrointestinal:  Negative for abdominal pain, nausea and vomiting.   Musculoskeletal:  Negative for myalgias.   Neurological:  Negative for dizziness, light-headedness and headaches.   Hematological:  Negative for adenopathy.         Past Medical History:  Past Medical History:   Diagnosis Date    Eye degeneration     left    Hypertension       Past Surgical History:   Procedure Laterality Date    DILATION AND CURETTAGE OF UTERUS      EYE SURGERY      HYSTERECTOMY      TOTAL KNEE ARTHROPLASTY Bilateral       Review of patient's allergies indicates:   Allergen Reactions    Meperidine Nausea Only      Current Outpatient Medications   Medication Sig Dispense Refill    albuterol (PROVENTIL HFA) 90 mcg/actuation inhaler Inhale 2 puffs into the lungs every 6 (six) hours as needed for Wheezing or Shortness of Breath. Rescue 8 g 1    amLODIPine (NORVASC) 10 MG tablet TAKE 1 TABLET BY MOUTH EVERY DAY 90 tablet 3    atorvastatin (LIPITOR) 20 MG tablet TAKE 1 TABLET BY MOUTH EVERYDAY AT BEDTIME 90 tablet 3     citalopram (CELEXA) 20 MG tablet TAKE 1 TABLET BY MOUTH EVERY DAY 90 tablet 2    colchicine (COLCRYS) 0.6 mg tablet Take 2 tablet by mouth now and 1 tablet by mouth 1 hour later as directed. Can repeat dose after 3 days prn gout. 12 tablet 3    ferrous gluconate (FERGON) 324 MG tablet TAKE 1 TABLET BY MOUTH EVERY OTHER DAY WITH BREAKFAST 15 tablet 1    levalbuterol (XOPENEX) 1.25 mg/3 mL nebulizer solution INHALE 1 VIAL VIA NEBULIZER EVERY 6 HOURS AS NEEDED FOR WHEEZING OR SHORTNESS OF BREATH **RESCUE** 360 mL 1    loratadine (CLARITIN) 10 mg tablet TAKE 1 TABLET BY MOUTH EVERY DAY 90 tablet 3    losartan-hydrochlorothiazide 100-12.5 mg (HYZAAR) 100-12.5 mg Tab TAKE 1 TABLET BY MOUTH EVERY DAY 90 tablet 3    metoprolol succinate (TOPROL-XL) 50 MG 24 hr tablet TAKE 1 TABLET BY MOUTH EVERY DAY 90 tablet 3    montelukast (SINGULAIR) 10 mg tablet TAKE 1 TABLET BY MOUTH EVERY DAY IN THE EVENING 90 tablet 1     No current facility-administered medications for this visit.     Social History     Socioeconomic History    Marital status:    Occupational History    Occupation: Retired RN   Tobacco Use    Smoking status: Never    Smokeless tobacco: Never   Substance and Sexual Activity    Alcohol use: Never    Drug use: Not Currently    Sexual activity: Not Currently      Family History   Problem Relation Age of Onset    Heart disease Mother     Heart disease Father     Cancer Sister     Cancer Son         Objective:      Physical Exam  Constitutional:       Appearance: She is well-developed.   HENT:      Head: Normocephalic and atraumatic.   Eyes:      General: No scleral icterus.     Conjunctiva/sclera: Conjunctivae normal.      Pupils: Pupils are equal, round, and reactive to light.   Neck:      Thyroid: No thyroid mass.      Trachea: Trachea normal.   Cardiovascular:      Rate and Rhythm: Normal rate and regular rhythm.   Pulmonary:      Effort: Pulmonary effort is normal.      Breath sounds: Wheezing (mild  expiratory wheeze CALEB, clear throughout otherwise) present.   Musculoskeletal:      Cervical back: Normal range of motion and neck supple.   Neurological:      Mental Status: She is alert and oriented to person, place, and time.   Psychiatric:         Mood and Affect: Mood normal.         Behavior: Behavior normal.       Assessment:     1. Subacute bronchitis    2. Hypertension, unspecified type Stable   3. Stage 3b chronic kidney disease Stable     Plan:       PROBLEM LIST     Subacute bronchitis  Comments:  excellent response to treatment, continue neb treatments x 1 week; continue plain guafenesin w/ plenty of water    Hypertension, unspecified type    Stage 3b chronic kidney disease        Exellent response w/ treatment, bronchitis near resolution

## 2023-04-19 ENCOUNTER — PATIENT OUTREACH (OUTPATIENT)
Dept: ADMINISTRATIVE | Facility: HOSPITAL | Age: 83
End: 2023-04-19
Payer: MEDICARE

## 2023-04-19 DIAGNOSIS — K57.30 DIVERTICULOSIS, SIGMOID: ICD-10-CM

## 2023-04-19 DIAGNOSIS — Z86.010 HISTORY OF COLON POLYPS: ICD-10-CM

## 2023-04-19 PROBLEM — Z86.0100 HISTORY OF COLON POLYPS: Status: ACTIVE | Noted: 2021-09-07

## 2023-04-24 ENCOUNTER — OFFICE VISIT (OUTPATIENT)
Dept: FAMILY MEDICINE | Facility: CLINIC | Age: 83
End: 2023-04-24
Payer: MEDICARE

## 2023-04-24 VITALS
DIASTOLIC BLOOD PRESSURE: 76 MMHG | HEART RATE: 72 BPM | RESPIRATION RATE: 18 BRPM | HEIGHT: 64 IN | BODY MASS INDEX: 35.51 KG/M2 | SYSTOLIC BLOOD PRESSURE: 144 MMHG | WEIGHT: 208 LBS | OXYGEN SATURATION: 95 % | TEMPERATURE: 97 F

## 2023-04-24 DIAGNOSIS — J30.2 SEASONAL ALLERGIES: ICD-10-CM

## 2023-04-24 DIAGNOSIS — N32.81 OAB (OVERACTIVE BLADDER): Primary | ICD-10-CM

## 2023-04-24 PROCEDURE — 99213 PR OFFICE/OUTPT VISIT, EST, LEVL III, 20-29 MIN: ICD-10-PCS | Mod: S$GLB,,, | Performed by: NURSE PRACTITIONER

## 2023-04-24 PROCEDURE — 99213 OFFICE O/P EST LOW 20 MIN: CPT | Mod: S$GLB,,, | Performed by: NURSE PRACTITIONER

## 2023-04-24 RX ORDER — AZELASTINE 1 MG/ML
1 SPRAY, METERED NASAL 2 TIMES DAILY
Qty: 30 ML | Refills: 1 | Status: SHIPPED | OUTPATIENT
Start: 2023-04-24 | End: 2023-06-19

## 2023-04-24 RX ORDER — LOSARTAN POTASSIUM 100 MG/1
100 TABLET ORAL DAILY
COMMUNITY
Start: 2023-03-24

## 2023-04-24 RX ORDER — AMLODIPINE BESYLATE 2.5 MG/1
2.5 TABLET ORAL 3 TIMES DAILY
COMMUNITY
Start: 2023-03-27 | End: 2023-11-15

## 2023-04-24 RX ORDER — ASPIRIN 81 MG
81 TABLET, DELAYED RELEASE (ENTERIC COATED) ORAL DAILY
COMMUNITY
Start: 2023-02-01

## 2023-04-24 NOTE — PROGRESS NOTES
"Subjective:       Patient ID: Iman Wood is a 82 y.o. female.    Chief Complaint: Follow-up (Pt is here for a 3 month follow up. Pt states she is still wheezing. Pt also is going to the bathroom more than often during the night and she is concerned.)    C/o needing to "go to bathroom all night long." Report waking up about 5 times during the night to go to restroom. Interfering w/ quality of sleep.     Recently treated for acute wheezy bronchitis. She has been working outside in flower beds. She reports that wheezing is still present--though is is not coughing or has chest tightness. Some sneezing. She is using claritin q evening. No longer on any nebulized breathing treatments.     Review of Systems   Constitutional:  Negative for chills and fever.   HENT:  Positive for rhinorrhea and sneezing.    Respiratory:  Positive for wheezing. Negative for cough, chest tightness and shortness of breath.    Cardiovascular:  Negative for chest pain and palpitations.   Gastrointestinal:  Negative for abdominal pain, nausea and vomiting.   Genitourinary:  Positive for difficulty urinating, frequency and urgency. Negative for dysuria and hematuria.   Musculoskeletal:  Negative for myalgias.   Neurological:  Negative for dizziness, light-headedness and headaches.   Hematological:  Negative for adenopathy.         Past Medical History:  Past Medical History:   Diagnosis Date    Eye degeneration     left    Hypertension       Past Surgical History:   Procedure Laterality Date    DILATION AND CURETTAGE OF UTERUS      EYE SURGERY      HYSTERECTOMY      TOTAL KNEE ARTHROPLASTY Bilateral       Review of patient's allergies indicates:   Allergen Reactions    Meperidine Nausea Only      Current Outpatient Medications   Medication Sig Dispense Refill    ADULT LOW DOSE ASPIRIN 81 mg EC tablet Take 81 mg by mouth Daily.      albuterol (PROVENTIL HFA) 90 mcg/actuation inhaler Inhale 2 puffs into the lungs every 6 (six) hours as needed for " Wheezing or Shortness of Breath. Rescue 8 g 1    amLODIPine (NORVASC) 2.5 MG tablet Take 2.5 mg by mouth 3 (three) times daily. Take 2 tablets by mouth every morning and take 1 tablet by mouth every evening.      atorvastatin (LIPITOR) 20 MG tablet TAKE 1 TABLET BY MOUTH EVERYDAY AT BEDTIME 90 tablet 3    citalopram (CELEXA) 20 MG tablet TAKE 1 TABLET BY MOUTH EVERY DAY 90 tablet 2    colchicine (COLCRYS) 0.6 mg tablet Take 2 tablet by mouth now and 1 tablet by mouth 1 hour later as directed. Can repeat dose after 3 days prn gout. 12 tablet 3    ferrous gluconate (FERGON) 324 MG tablet TAKE 1 TABLET BY MOUTH EVERY OTHER DAY WITH BREAKFAST 15 tablet 1    levalbuterol (XOPENEX) 1.25 mg/3 mL nebulizer solution INHALE 1 VIAL VIA NEBULIZER EVERY 6 HOURS AS NEEDED FOR WHEEZING OR SHORTNESS OF BREATH **RESCUE** 360 mL 1    loratadine (CLARITIN) 10 mg tablet TAKE 1 TABLET BY MOUTH EVERY DAY 90 tablet 3    losartan (COZAAR) 100 MG tablet Take 100 mg by mouth Daily.      losartan-hydrochlorothiazide 100-12.5 mg (HYZAAR) 100-12.5 mg Tab TAKE 1 TABLET BY MOUTH EVERY DAY 90 tablet 3    metoprolol succinate (TOPROL-XL) 50 MG 24 hr tablet TAKE 1 TABLET BY MOUTH EVERY DAY 90 tablet 3    montelukast (SINGULAIR) 10 mg tablet TAKE 1 TABLET BY MOUTH EVERY DAY IN THE EVENING 90 tablet 1    azelastine (ASTELIN) 137 mcg (0.1 %) nasal spray 1 spray (137 mcg total) by Nasal route 2 (two) times daily. 30 mL 1     No current facility-administered medications for this visit.     Social History     Socioeconomic History    Marital status:    Occupational History    Occupation: Retired RN   Tobacco Use    Smoking status: Never    Smokeless tobacco: Never   Substance and Sexual Activity    Alcohol use: Never    Drug use: Not Currently    Sexual activity: Not Currently      Family History   Problem Relation Age of Onset    Heart disease Mother     Heart disease Father     Cancer Sister     Cancer Son         Objective:      Physical  Exam  Constitutional:       Appearance: She is well-developed.   HENT:      Head: Normocephalic and atraumatic.   Eyes:      General: No scleral icterus.     Conjunctiva/sclera: Conjunctivae normal.      Pupils: Pupils are equal, round, and reactive to light.   Neck:      Thyroid: No thyroid mass.      Trachea: Trachea normal.   Cardiovascular:      Rate and Rhythm: Normal rate and regular rhythm.   Pulmonary:      Effort: Pulmonary effort is normal.      Breath sounds: Normal breath sounds.   Musculoskeletal:      Cervical back: Normal range of motion and neck supple.   Neurological:      Mental Status: She is alert and oriented to person, place, and time.   Psychiatric:         Mood and Affect: Mood normal.         Behavior: Behavior normal.       Assessment:     1. OAB (overactive bladder)    2. Seasonal allergies      Plan:       PROBLEM LIST     OAB (overactive bladder)    Seasonal allergies  -     azelastine (ASTELIN) 137 mcg (0.1 %) nasal spray; 1 spray (137 mcg total) by Nasal route 2 (two) times daily.  Dispense: 30 mL; Refill: 1        Discussed bladder training. Recommend setting alarm clock every 2 hours during waking hours. Empty bladder every 2 hours...even if you don't feel the urge to go restroom. This will train bladder to require less emptying at night.     Resume breathing treatments twice daily. Recommend changing clothes after working in flower beds. Consider eating small amt local honey on daily basis. Start astelin nasal spray.

## 2023-06-06 ENCOUNTER — PATIENT MESSAGE (OUTPATIENT)
Dept: HEMATOLOGY/ONCOLOGY | Facility: CLINIC | Age: 83
End: 2023-06-06
Payer: MEDICARE

## 2023-06-12 LAB
ALBUMIN SERPL BCP-MCNC: 3.3 G/DL (ref 3.4–5)
ALBUMIN/GLOBULIN RATIO: 0.79 RATIO (ref 1.1–1.8)
ALP SERPL-CCNC: 95 U/L (ref 46–116)
ALT SERPL W P-5'-P-CCNC: 20 U/L (ref 12–78)
ANION GAP SERPL CALC-SCNC: 9 MMOL/L (ref 3–11)
AST SERPL-CCNC: 24 U/L (ref 15–37)
BASOPHILS NFR BLD: 0.6 % (ref 0–3)
BILIRUB SERPL-MCNC: 0.3 MG/DL (ref 0–1)
BUN SERPL-MCNC: 26 MG/DL (ref 7–18)
BUN/CREAT SERPL: 17.33 RATIO (ref 7–18)
CALCIUM SERPL-MCNC: 9.4 MG/DL (ref 8.8–10.5)
CHLORIDE SERPL-SCNC: 107 MMOL/L (ref 100–108)
CO2 SERPL-SCNC: 28 MMOL/L (ref 21–32)
CREAT SERPL-MCNC: 1.5 MG/DL (ref 0.55–1.02)
EOSINOPHIL NFR BLD: 7.3 % (ref 1–3)
ERYTHROCYTE [DISTWIDTH] IN BLOOD BY AUTOMATED COUNT: 14.7 % (ref 12.5–18)
GFR ESTIMATION: 33
GLOBULIN: 4.2 G/DL (ref 2.3–3.5)
GLUCOSE SERPL-MCNC: 145 MG/DL (ref 70–110)
HCT VFR BLD AUTO: 41.1 % (ref 37–47)
HGB BLD-MCNC: 13.1 G/DL (ref 12–16)
LYMPHOCYTES NFR BLD: 20 % (ref 25–40)
MCH RBC QN AUTO: 29.1 PG (ref 27–31.2)
MCHC RBC AUTO-ENTMCNC: 31.9 G/DL (ref 31.8–35.4)
MCV RBC AUTO: 91.3 FL (ref 80–97)
MONOCYTES NFR BLD: 7.3 % (ref 1–15)
NEUTROPHILS # BLD AUTO: 4.05 10*3/UL (ref 1.8–7.7)
NEUTROPHILS NFR BLD: 64.5 % (ref 37–80)
NUCLEATED RED BLOOD CELLS: 0 %
PLATELETS: 212 10*3/UL (ref 142–424)
POTASSIUM SERPL-SCNC: 3.7 MMOL/L (ref 3.6–5.2)
PROT SERPL-MCNC: 7.5 G/DL (ref 6.4–8.2)
RBC # BLD AUTO: 4.5 10*6/UL (ref 4.2–5.4)
SODIUM BLD-SCNC: 144 MMOL/L (ref 135–145)
WBC # BLD: 6.3 10*3/UL (ref 4.6–10.2)

## 2023-06-15 LAB
ALBUMIN, ELECTROPHORESIS: 3.84 G/DL (ref 3.75–5.01)
ALPHA1 GLOB FLD ELPH-MCNC: 0.35 G/DL (ref 0.19–0.46)
ALPHA2 GLOB FLD ELPH-MCNC: 0.82 G/DL (ref 0.48–1.05)
B-GLOBULIN FLD ELPH-MCNC: 0.97 G/DL (ref 0.48–1.1)
EER PROTEIN ELECTROPHORESIS, SERUM: NORMAL
GAMMA GLOB FLD ELPH-MCNC: 0.93 G/DL (ref 0.62–1.51)
IGA SERPL-MCNC: 459 MG/DL (ref 68–408)
IGG SERPL-MCNC: 927 MG/DL (ref 768–1632)
IGM: 26 MG/DL (ref 35–263)
INTERPRETATION SERPL IFE-IMP: NORMAL
KAPPA FREE LIGHT CHAINS: 41.11 MG/L (ref 3.3–19.4)
KAPPA/LAMBDA FLC RATIO: 1.42 (ref 0.26–1.65)
LAMBDA LIGHT CHAIN, FREE, SERUM: 29.03 MG/L (ref 5.71–26.3)
MONOCLONAL PROTEIN: NORMAL G/DL
TOTAL PROTEIN, ELECTROPHORESIS: 6.9 G/DL (ref 6.3–8.2)

## 2023-06-18 DIAGNOSIS — J30.2 SEASONAL ALLERGIES: ICD-10-CM

## 2023-06-19 ENCOUNTER — OFFICE VISIT (OUTPATIENT)
Dept: HEMATOLOGY/ONCOLOGY | Facility: CLINIC | Age: 83
End: 2023-06-19
Payer: MEDICARE

## 2023-06-19 VITALS
DIASTOLIC BLOOD PRESSURE: 77 MMHG | BODY MASS INDEX: 36.54 KG/M2 | HEART RATE: 67 BPM | SYSTOLIC BLOOD PRESSURE: 166 MMHG | WEIGHT: 214 LBS | HEIGHT: 64 IN | OXYGEN SATURATION: 97 % | RESPIRATION RATE: 16 BRPM

## 2023-06-19 DIAGNOSIS — D47.2 MGUS (MONOCLONAL GAMMOPATHY OF UNKNOWN SIGNIFICANCE): Primary | ICD-10-CM

## 2023-06-19 DIAGNOSIS — N18.32 STAGE 3B CHRONIC KIDNEY DISEASE: ICD-10-CM

## 2023-06-19 PROCEDURE — 99214 OFFICE O/P EST MOD 30 MIN: CPT | Mod: S$GLB,,, | Performed by: NURSE PRACTITIONER

## 2023-06-19 PROCEDURE — 99214 PR OFFICE/OUTPT VISIT, EST, LEVL IV, 30-39 MIN: ICD-10-PCS | Mod: S$GLB,,, | Performed by: NURSE PRACTITIONER

## 2023-06-19 RX ORDER — AZELASTINE 1 MG/ML
SPRAY, METERED NASAL
Qty: 30 ML | Refills: 3 | Status: SHIPPED | OUTPATIENT
Start: 2023-06-19

## 2023-06-19 NOTE — PROGRESS NOTES
HEMATOLOGY F/UP CONSULTATION NOTE    Patient ID: Iman Wood is a 82 y.o. female.    Chief Complaint: MGUS    HPI: Patient is a 81 year old female with PMH of HTN, DM, CKD Stage 3b, h/o abrupt loss of vision in Oct 21 who is referred by Nephrology for evaluation of worsening kidney function and work-up revealing IgG kappa monoclonal gammopathy. Voices no acute complaints. Presents to our clinic today for further evaluation. Reports arthralgias and bone pain.         Past Medical History:   Diagnosis Date    Eye degeneration     left    Hypertension        Family History   Problem Relation Age of Onset    Heart disease Mother     Heart disease Father     Cancer Sister     Cancer Son        Social History     Socioeconomic History    Marital status:    Occupational History    Occupation: Retired RN   Tobacco Use    Smoking status: Never    Smokeless tobacco: Never   Substance and Sexual Activity    Alcohol use: Never    Drug use: Not Currently    Sexual activity: Not Currently         Past Surgical History:   Procedure Laterality Date    DILATION AND CURETTAGE OF UTERUS      EYE SURGERY      HYSTERECTOMY      TOTAL KNEE ARTHROPLASTY Bilateral        Review of systems:  Review of Systems   Constitutional:  Negative for activity change, appetite change, chills, diaphoresis, fatigue and unexpected weight change.   HENT:  Negative for congestion, facial swelling, hearing loss, mouth sores, trouble swallowing and voice change.    Eyes:  Positive for visual disturbance. Negative for photophobia, pain, discharge and itching.   Respiratory:  Negative for apnea, cough, choking, chest tightness and shortness of breath.    Cardiovascular:  Negative for chest pain, palpitations and leg swelling.   Gastrointestinal:  Negative for abdominal distention, abdominal pain, anal bleeding and blood in stool.   Endocrine: Negative for cold intolerance, heat intolerance, polydipsia and polyphagia.   Genitourinary:  Negative for  difficulty urinating, dysuria, flank pain and hematuria.   Musculoskeletal:  Positive for arthralgias. Negative for back pain, joint swelling, myalgias, neck pain and neck stiffness.   Skin:  Negative for color change, pallor and wound.   Allergic/Immunologic: Negative for environmental allergies, food allergies and immunocompromised state.   Neurological:  Negative for dizziness, seizures, facial asymmetry, speech difficulty, light-headedness, numbness and headaches.   Hematological:  Negative for adenopathy. Does not bruise/bleed easily.   Psychiatric/Behavioral:  Negative for agitation, behavioral problems, confusion, decreased concentration and sleep disturbance.                                Physical Exam  Vitals and nursing note reviewed.   Constitutional:       General: She is not in acute distress.     Appearance: Normal appearance. She is not ill-appearing.   HENT:      Head: Normocephalic and atraumatic.      Nose: No congestion or rhinorrhea.   Eyes:      General: No scleral icterus.     Extraocular Movements: Extraocular movements intact.      Pupils: Pupils are equal, round, and reactive to light.      Comments: +ve for chronic Eye and eyelid changes   Cardiovascular:      Rate and Rhythm: Normal rate and regular rhythm.      Pulses: Normal pulses.      Heart sounds: Normal heart sounds. No murmur heard.    No gallop.   Pulmonary:      Effort: Pulmonary effort is normal. No respiratory distress.      Breath sounds: Normal breath sounds. No stridor. No wheezing or rhonchi.   Abdominal:      General: Bowel sounds are normal. There is no distension.      Palpations: There is no mass.      Tenderness: There is no abdominal tenderness. There is no guarding.   Musculoskeletal:         General: No swelling, tenderness, deformity or signs of injury. Normal range of motion.      Cervical back: Normal range of motion and neck supple. No rigidity. No muscular tenderness.      Right lower leg: No edema.      Left  lower leg: No edema.   Skin:     General: Skin is warm.      Coloration: Skin is not jaundiced or pale.      Findings: No bruising or lesion.   Neurological:      General: No focal deficit present.      Mental Status: She is alert and oriented to person, place, and time.      Cranial Nerves: No cranial nerve deficit.      Sensory: No sensory deficit.      Motor: No weakness.      Gait: Gait normal.   Psychiatric:         Mood and Affect: Mood normal.         Behavior: Behavior normal.         Thought Content: Thought content normal.     Vitals:    06/19/23 0920   BP: (!) 166/77   Pulse: 67   Resp: 16   Body surface area is 2.09 meters squared.    Assessment/Plan:        MGUS:     == Will initiate work-up for MGUS vs Multiple myeloma with serum and urine protein electrophoresis, TANIA and also obtain PET scan to r/o lytic lesions which could be attributing to her bone pain.  == Will defer bone marrow biopsy until we have the above investigations back   == Discussed with her in detail future management options giving her opportunity to ask questions. No hypercalcemia or anemia at this time noted but patient has bone pain and worsening renal function along with IgG Kappa monoclonal gammopathy at outside labs.  == 9/15/22:  PET scan showed mild uptake along the spinous process of L4 and L5 which appear to be related to osteoarthritis changes.  Uptake is seen around bilateral knee prosthesis which is related to loosening of prosthesis.  No evidence of osteolytic lesions noted on PET scan.  No evidence of CRAB features secondary to plasma cell dyscrasia.  Immunofixation electrophoresis showed very faint band in IgG kappa and IgG lambda suggestive of early monoclonal protein versus a specific immune response.  I will hence continue with observation only at this time.  Repeat myeloma relevant labs in 4 months.  == 1/19/23:  Worsening of kappa free light chains but normal kappa lambda free light chain ratio.  No evidence of  hypercalcemia.  Mild worsening and fluctuation in kidney function which I do not feel is related to multiple myeloma related proteins.  Will hence continue with observation only.  Advised to continue to follow-up with PCP for chronic kidney disease.  ==06/19/2023: Stable MGUS per labs, hypocalcemia resolved, renal function improved.  Will continue with observation only and continue to monitor q 4 months.      Plan:  Continue observation  Repeat Multiple Myeloma/ MGUS relevant labs in 4 months      RTC in 4 four months for MD visit for f/up    Pt is instructed to RTC with labs for continued monitoring of treatment as instructed.     Total time spent in counseling and discussion about further management options including relevant lab work, treatment,  prognosis, medications and intended side effects was more than 25 minutes. More than 50 % of the time was spent in counseling and coordination of care.  I spent a total of 25 minutes on the day of the visit.This includes face to face time and non-face to face time preparing to see the patient (eg, review of tests), Obtaining and/or reviewing separately obtained history, Documenting clinical information in the electronic or other health record, Independently interpreting resultsand communicating results to the patient/family/caregiver, or Care coordination.

## 2023-09-11 DIAGNOSIS — J30.2 SEASONAL ALLERGIES: Chronic | ICD-10-CM

## 2023-09-11 RX ORDER — MONTELUKAST SODIUM 10 MG/1
TABLET ORAL
Qty: 90 TABLET | Refills: 1 | Status: SHIPPED | OUTPATIENT
Start: 2023-09-11 | End: 2024-01-10

## 2023-09-25 ENCOUNTER — TELEPHONE (OUTPATIENT)
Dept: HEMATOLOGY/ONCOLOGY | Facility: CLINIC | Age: 83
End: 2023-09-25
Payer: MEDICARE

## 2023-09-25 NOTE — TELEPHONE ENCOUNTER
----- Message from Harika Lou sent at 9/25/2023 11:04 AM CDT -----  Contact: Iman Valerio needs a call back at [phone], Regards to getting her lab appointment reschedule to be done before 10/5/23.    Thanks  Td      Called back patient and coordinated a new lab draw date/time with her. KB LPN.

## 2023-10-02 RX ORDER — CITALOPRAM 20 MG/1
20 TABLET, FILM COATED ORAL DAILY
Qty: 90 TABLET | Refills: 3 | Status: SHIPPED | OUTPATIENT
Start: 2023-10-02 | End: 2024-01-17 | Stop reason: SDUPTHER

## 2023-10-02 NOTE — TELEPHONE ENCOUNTER
----- Message from Tricia Sarmiento sent at 10/2/2023 10:33 AM CDT -----  Contact: Iman  .Type:  RX Refill Request    Who Called: Iman   Refill or New Rx:refill   RX Name and Strength:citalopram (CELEXA) 20 MG tablet  How is the patient currently taking it? (ex. 1XDay):as prescribed   Is this a 30 day or 90 day RX:30 days   Preferred Pharmacy with phone number:..  [facility]/pharmacy #[id number] - [place] - [address] AT [facility]  [address]  [place] [address]  Phone: [phone] Fax: [phone]  Local or Mail Order:local   Ordering Provider:Irma Dallas  Would the patient rather a call back or a response via [facility]? Call   Best Call Back Number:.[phone]   Additional Information: Pt requesting medication

## 2023-10-04 LAB
ALBUMIN SERPL BCP-MCNC: 3.7 G/DL (ref 3.4–5)
ALBUMIN/GLOBULIN RATIO: 1.09 RATIO (ref 1.1–1.8)
ALP SERPL-CCNC: 95 U/L (ref 46–116)
ALT SERPL W P-5'-P-CCNC: 27 U/L (ref 12–78)
ANION GAP SERPL CALC-SCNC: 10 MMOL/L (ref 3–11)
AST SERPL-CCNC: 25 U/L (ref 15–37)
BASOPHILS NFR BLD: 0.7 % (ref 0–3)
BILIRUB SERPL-MCNC: 0.4 MG/DL (ref 0–1)
BUN SERPL-MCNC: 31 MG/DL (ref 7–18)
BUN/CREAT SERPL: 25 RATIO (ref 7–18)
CALCIUM SERPL-MCNC: 9.3 MG/DL (ref 8.8–10.5)
CHLORIDE SERPL-SCNC: 108 MMOL/L (ref 100–108)
CO2 SERPL-SCNC: 25 MMOL/L (ref 21–32)
CREAT SERPL-MCNC: 1.24 MG/DL (ref 0.55–1.02)
EOSINOPHIL NFR BLD: 5.1 % (ref 1–3)
ERYTHROCYTE [DISTWIDTH] IN BLOOD BY AUTOMATED COUNT: 15.4 % (ref 12.5–18)
GFR ESTIMATION: 41
GLOBULIN: 3.4 G/DL (ref 2.3–3.5)
GLUCOSE SERPL-MCNC: 101 MG/DL (ref 70–110)
HCT VFR BLD AUTO: 41.9 % (ref 37–47)
HGB BLD-MCNC: 12.7 G/DL (ref 12–16)
HYPOCHROMIA BLD QL SMEAR: NORMAL
LYMPHOCYTES NFR BLD: 21.3 % (ref 25–40)
MCH RBC QN AUTO: 28.9 PG (ref 27–31.2)
MCHC RBC AUTO-ENTMCNC: 30.3 G/DL (ref 31.8–35.4)
MCV RBC AUTO: 95.2 FL (ref 80–97)
MONOCYTES NFR BLD: 8.9 % (ref 1–15)
NEUTROPHILS # BLD AUTO: 3.49 10*3/UL (ref 1.8–7.7)
NEUTROPHILS NFR BLD: 63.6 % (ref 37–80)
NUCLEATED RED BLOOD CELLS: 0 %
PLATELETS: 184 10*3/UL (ref 142–424)
POTASSIUM SERPL-SCNC: 4.2 MMOL/L (ref 3.6–5.2)
PROT SERPL-MCNC: 7.1 G/DL (ref 6.4–8.2)
RBC # BLD AUTO: 4.4 10*6/UL (ref 4.2–5.4)
SODIUM BLD-SCNC: 143 MMOL/L (ref 135–145)
WBC # BLD: 5.5 10*3/UL (ref 4.6–10.2)

## 2023-10-06 LAB
IGA SERPL-MCNC: 478 MG/DL (ref 68–408)
IGG SERPL-MCNC: 923 MG/DL (ref 768–1632)
IGM: 28 MG/DL (ref 35–263)
KAPPA FREE LIGHT CHAINS: 44.26 MG/L (ref 3.3–19.4)
KAPPA/LAMBDA FLC RATIO: 1.53 (ref 0.26–1.65)
LAMBDA LIGHT CHAIN, FREE, SERUM: 29 MG/L (ref 5.71–26.3)

## 2023-10-16 ENCOUNTER — OFFICE VISIT (OUTPATIENT)
Dept: HEMATOLOGY/ONCOLOGY | Facility: CLINIC | Age: 83
End: 2023-10-16
Payer: MEDICARE

## 2023-10-16 VITALS
DIASTOLIC BLOOD PRESSURE: 76 MMHG | HEIGHT: 64 IN | SYSTOLIC BLOOD PRESSURE: 169 MMHG | HEART RATE: 64 BPM | RESPIRATION RATE: 18 BRPM | WEIGHT: 211.63 LBS | OXYGEN SATURATION: 95 % | BODY MASS INDEX: 36.13 KG/M2

## 2023-10-16 DIAGNOSIS — D47.2 MGUS (MONOCLONAL GAMMOPATHY OF UNKNOWN SIGNIFICANCE): Primary | ICD-10-CM

## 2023-10-16 DIAGNOSIS — I10 HYPERTENSION, UNSPECIFIED TYPE: ICD-10-CM

## 2023-10-16 PROCEDURE — 99214 PR OFFICE/OUTPT VISIT, EST, LEVL IV, 30-39 MIN: ICD-10-PCS | Mod: S$GLB,,, | Performed by: NURSE PRACTITIONER

## 2023-10-16 PROCEDURE — 99214 OFFICE O/P EST MOD 30 MIN: CPT | Mod: S$GLB,,, | Performed by: NURSE PRACTITIONER

## 2023-10-16 NOTE — PROGRESS NOTES
HEMATOLOGY F/UP CONSULTATION NOTE    Patient ID: Iman Wood is a 82 y.o. female.    Chief Complaint: MGUS    HPI: Patient is a 81 year old female with PMH of HTN, DM, CKD Stage 3b, h/o abrupt loss of vision in Oct 21 who is referred by Nephrology for evaluation of worsening kidney function and work-up revealing IgG kappa monoclonal gammopathy. Voices no acute complaints. Presents to our clinic today for further evaluation. Reports arthralgias and bone pain.         Past Medical History:   Diagnosis Date    Eye degeneration     left    Hypertension        Family History   Problem Relation Age of Onset    Heart disease Mother     Heart disease Father     Cancer Sister     Cancer Son        Social History     Socioeconomic History    Marital status:    Occupational History    Occupation: Retired RN   Tobacco Use    Smoking status: Never    Smokeless tobacco: Never   Substance and Sexual Activity    Alcohol use: Never    Drug use: Not Currently    Sexual activity: Not Currently     Social Determinants of Health     Stress: No Stress Concern Present (2/24/2020)    [employer] - Occupational Stress Questionnaire     Feeling of Stress : Only a little         Past Surgical History:   Procedure Laterality Date    DILATION AND CURETTAGE OF UTERUS      EYE SURGERY      HYSTERECTOMY      TOTAL KNEE ARTHROPLASTY Bilateral        Review of systems:  Review of Systems   Constitutional:  Negative for activity change, appetite change, chills, diaphoresis, fatigue and unexpected weight change.   HENT:  Negative for congestion, facial swelling, hearing loss, mouth sores, trouble swallowing and voice change.    Eyes:  Positive for visual disturbance. Negative for photophobia, pain, discharge and itching.   Respiratory:  Negative for apnea, cough, choking, chest tightness and shortness of breath.    Cardiovascular:  Negative for chest pain, palpitations and leg swelling.   Gastrointestinal:   Negative for abdominal distention, abdominal pain, anal bleeding and blood in stool.   Endocrine: Negative for cold intolerance, heat intolerance, polydipsia and polyphagia.   Genitourinary:  Negative for difficulty urinating, dysuria, flank pain and hematuria.   Musculoskeletal:  Positive for arthralgias. Negative for back pain, joint swelling, myalgias, neck pain and neck stiffness.   Skin:  Negative for color change, pallor and wound.   Allergic/Immunologic: Negative for environmental allergies, food allergies and immunocompromised state.   Neurological:  Negative for dizziness, seizures, facial asymmetry, speech difficulty, light-headedness, numbness and headaches.   Hematological:  Negative for adenopathy. Does not bruise/bleed easily.   Psychiatric/Behavioral:  Negative for agitation, behavioral problems, confusion, decreased concentration and sleep disturbance.                                Physical Exam  Vitals and nursing note reviewed.   Constitutional:       General: She is not in acute distress.     Appearance: Normal appearance. She is not ill-appearing.   HENT:      Head: Normocephalic and atraumatic.      Nose: No congestion or rhinorrhea.   Eyes:      General: No scleral icterus.     Extraocular Movements: Extraocular movements intact.      Pupils: Pupils are equal, round, and reactive to light.      Comments: +ve for chronic Eye and eyelid changes   Cardiovascular:      Rate and Rhythm: Normal rate and regular rhythm.      Pulses: Normal pulses.      Heart sounds: Normal heart sounds. No murmur heard.     No gallop.   Pulmonary:      Effort: Pulmonary effort is normal. No respiratory distress.      Breath sounds: Normal breath sounds. No stridor. No wheezing or rhonchi.   Abdominal:      General: Bowel sounds are normal. There is no distension.      Palpations: There is no mass.      Tenderness: There is no abdominal tenderness. There is no guarding.   Musculoskeletal:         General: No swelling,  tenderness, deformity or signs of injury. Normal range of motion.      Cervical back: Normal range of motion and neck supple. No rigidity. No muscular tenderness.      Right lower leg: No edema.      Left lower leg: No edema.   Skin:     General: Skin is warm.      Coloration: Skin is not jaundiced or pale.      Findings: No bruising or lesion.   Neurological:      General: No focal deficit present.      Mental Status: She is alert and oriented to person, place, and time.      Cranial Nerves: No cranial nerve deficit.      Sensory: No sensory deficit.      Motor: No weakness.      Gait: Gait normal.   Psychiatric:         Mood and Affect: Mood normal.         Behavior: Behavior normal.         Thought Content: Thought content normal.     Vitals:    10/16/23 0905   BP: (!) 169/76   Pulse: 64   Resp: 18   Body surface area is 2.08 meters squared.    Assessment/Plan:        MGUS:     == Will initiate work-up for MGUS vs Multiple myeloma with serum and urine protein electrophoresis, TANIA and also obtain PET scan to r/o lytic lesions which could be attributing to her bone pain.  == Will defer bone marrow biopsy until we have the above investigations back   == Discussed with her in detail future management options giving her opportunity to ask questions. No hypercalcemia or anemia at this time noted but patient has bone pain and worsening renal function along with IgG Kappa monoclonal gammopathy at outside labs.  == 9/15/22:  PET scan showed mild uptake along the spinous process of L4 and L5 which appear to be related to osteoarthritis changes.  Uptake is seen around bilateral knee prosthesis which is related to loosening of prosthesis.  No evidence of osteolytic lesions noted on PET scan.  No evidence of CRAB features secondary to plasma cell dyscrasia.  Immunofixation electrophoresis showed very faint band in IgG kappa and IgG lambda suggestive of early monoclonal protein versus a specific immune response.  I will hence  continue with observation only at this time.  Repeat myeloma relevant labs in 4 months.  == 1/19/23:  Worsening of kappa free light chains but normal kappa lambda free light chain ratio.  No evidence of hypercalcemia.  Mild worsening and fluctuation in kidney function which I do not feel is related to multiple myeloma related proteins.  Will hence continue with observation only.  Advised to continue to follow-up with PCP for chronic kidney disease.  ==10/16/2023: Stable MGUS per labs, hypocalcemia resolved, renal function improved.  Will continue with observation only and continue to monitor q 4 months.    2. Hypertension  ==10/16/23: advised to f/u with PCP with BP log    Plan:  Continue observation  Repeat Multiple Myeloma/ MGUS relevant labs in 4 months      RTC in 4 four months for MD visit for f/up    Pt is instructed to RTC with labs for continued monitoring of treatment as instructed.     Total time spent in counseling and discussion about further management options including relevant lab work, treatment,  prognosis, medications and intended side effects was more than 25 minutes. More than 50 % of the time was spent in counseling and coordination of care.  I spent a total of 25 minutes on the day of the visit.This includes face to face time and non-face to face time preparing to see the patient (eg, review of tests), Obtaining and/or reviewing separately obtained history, Documenting clinical information in the electronic or other health record, Independently interpreting resultsand communicating results to the patient/family/caregiver, or Care coordination.

## 2023-11-15 ENCOUNTER — OFFICE VISIT (OUTPATIENT)
Dept: FAMILY MEDICINE | Facility: CLINIC | Age: 83
End: 2023-11-15
Payer: MEDICARE

## 2023-11-15 VITALS
BODY MASS INDEX: 35.68 KG/M2 | HEIGHT: 64 IN | WEIGHT: 209 LBS | RESPIRATION RATE: 18 BRPM | HEART RATE: 67 BPM | SYSTOLIC BLOOD PRESSURE: 162 MMHG | OXYGEN SATURATION: 95 % | DIASTOLIC BLOOD PRESSURE: 84 MMHG

## 2023-11-15 DIAGNOSIS — N18.32 STAGE 3B CHRONIC KIDNEY DISEASE: Chronic | ICD-10-CM

## 2023-11-15 DIAGNOSIS — I10 SYSTOLIC HYPERTENSION: Primary | Chronic | ICD-10-CM

## 2023-11-15 DIAGNOSIS — N32.81 OAB (OVERACTIVE BLADDER): Chronic | ICD-10-CM

## 2023-11-15 PROCEDURE — 99213 OFFICE O/P EST LOW 20 MIN: CPT | Mod: S$GLB,,, | Performed by: NURSE PRACTITIONER

## 2023-11-15 PROCEDURE — 99213 PR OFFICE/OUTPT VISIT, EST, LEVL III, 20-29 MIN: ICD-10-PCS | Mod: S$GLB,,, | Performed by: NURSE PRACTITIONER

## 2023-11-15 RX ORDER — ALLOPURINOL 100 MG/1
100 TABLET ORAL DAILY
COMMUNITY
Start: 2023-10-18

## 2023-11-15 RX ORDER — LOSARTAN POTASSIUM AND HYDROCHLOROTHIAZIDE 12.5; 1 MG/1; MG/1
1 TABLET ORAL DAILY
COMMUNITY

## 2023-11-15 RX ORDER — AMLODIPINE BESYLATE 10 MG/1
10 TABLET ORAL DAILY
COMMUNITY
Start: 2023-09-10 | End: 2023-11-15

## 2023-11-15 RX ORDER — AMLODIPINE BESYLATE 10 MG/1
5 TABLET ORAL 2 TIMES DAILY
COMMUNITY
End: 2023-12-13

## 2023-11-15 NOTE — PROGRESS NOTES
Subjective:       Patient ID: Iman Wood is a 83 y.o. female.    Chief Complaint: Hypertension (Pt is here for elevated BP. Pt states it has been elevated for a few weeks now.)    Systolic BP has been trending high of late. She was previously taking amlodipine 10 mg daily. Her BP was (typically) controlled w/ this dosing. Her nephrologist had changed her amlodipine from 10 mg daily to 2 tabs 2.5 mg in morning an 2.5 mg in evening. Since this adjustment, her mean SBP is trending in 140-170; DBP good... mean is 70-80.     Overactive bladder symptoms have persisted despite bladder training of urinating q 2 hours during wake hours. Previously, she failed oxybutynin in the past. She feels that she is going to the bathroom excessively in the evening and it is interfering with her sleep.       Review of Systems   Constitutional:  Negative for chills and fever.   HENT:  Negative for sneezing.    Respiratory:  Negative for cough, chest tightness, shortness of breath and wheezing.    Cardiovascular:  Negative for chest pain and palpitations.   Gastrointestinal:  Negative for abdominal pain, nausea and vomiting.   Genitourinary:  Positive for frequency and urgency. Negative for difficulty urinating, dysuria and hematuria.   Musculoskeletal:  Negative for myalgias.   Neurological:  Negative for dizziness, light-headedness and headaches.   Hematological:  Negative for adenopathy.           Past Medical History:  Past Medical History:   Diagnosis Date    Eye degeneration     left    Hypertension       Past Surgical History:   Procedure Laterality Date    DILATION AND CURETTAGE OF UTERUS      EYE SURGERY      HYSTERECTOMY      TOTAL KNEE ARTHROPLASTY Bilateral       Review of patient's allergies indicates:   Allergen Reactions    Meperidine Nausea Only      Current Outpatient Medications   Medication Sig Dispense Refill    ADULT LOW DOSE ASPIRIN 81 mg EC tablet Take 81 mg by mouth Daily.      albuterol (PROVENTIL HFA) 90  mcg/actuation inhaler Inhale 2 puffs into the lungs every 6 (six) hours as needed for Wheezing or Shortness of Breath. Rescue 8 g 1    allopurinoL (ZYLOPRIM) 100 MG tablet Take 100 mg by mouth once daily.      atorvastatin (LIPITOR) 20 MG tablet TAKE 1 TABLET BY MOUTH EVERYDAY AT BEDTIME 90 tablet 3    azelastine (ASTELIN) 137 mcg (0.1 %) nasal spray SPRAY 1 SPRAY IN EACH NOSTRIL TWICE DAILY 30 mL 3    citalopram (CELEXA) 20 MG tablet Take 1 tablet (20 mg total) by mouth once daily. 90 tablet 3    colchicine (COLCRYS) 0.6 mg tablet Take 2 tablet by mouth now and 1 tablet by mouth 1 hour later as directed. Can repeat dose after 3 days prn gout. 12 tablet 3    ferrous gluconate (FERGON) 324 MG tablet TAKE 1 TABLET BY MOUTH EVERY OTHER DAY WITH BREAKFAST 15 tablet 1    levalbuterol (XOPENEX) 1.25 mg/3 mL nebulizer solution INHALE 1 VIAL VIA NEBULIZER EVERY 6 HOURS AS NEEDED FOR WHEEZING OR SHORTNESS OF BREATH **RESCUE** 360 mL 1    loratadine (CLARITIN) 10 mg tablet TAKE 1 TABLET BY MOUTH EVERY DAY 90 tablet 3    metoprolol succinate (TOPROL-XL) 50 MG 24 hr tablet TAKE 1 TABLET BY MOUTH EVERY DAY 90 tablet 3    montelukast (SINGULAIR) 10 mg tablet TAKE 1 TABLET BY MOUTH EVERY DAY IN THE EVENING 90 tablet 1    amLODIPine (NORVASC) 10 MG tablet Take 5 mg by mouth 2 (two) times daily.      losartan (COZAAR) 100 MG tablet Take 100 mg by mouth Daily.      losartan-hydrochlorothiazide 100-12.5 mg (HYZAAR) 100-12.5 mg Tab Take 1 tablet by mouth once daily.       No current facility-administered medications for this visit.     Social History     Socioeconomic History    Marital status:    Occupational History    Occupation: Retired RN   Tobacco Use    Smoking status: Never    Smokeless tobacco: Never   Substance and Sexual Activity    Alcohol use: Never    Drug use: Not Currently    Sexual activity: Not Currently     Social Determinants of Health     Financial Resource Strain: Low Risk  (11/8/2023)    Overall Financial  Resource Strain (CARDIA)     Difficulty of Paying Living Expenses: Not hard at all   Food Insecurity: No Food Insecurity (11/8/2023)    Hunger Vital Sign     Worried About Running Out of Food in the Last Year: Never true     Ran Out of Food in the Last Year: Never true   Transportation Needs: No Transportation Needs (11/8/2023)    PRAPARE - Transportation     Lack of Transportation (Medical): No     Lack of Transportation (Non-Medical): No   Physical Activity: Inactive (11/8/2023)    Exercise Vital Sign     Days of Exercise per Week: 0 days     Minutes of Exercise per Session: 10 min   Stress: No Stress Concern Present (11/8/2023)    [employer] - Occupational Stress Questionnaire     Feeling of Stress : Only a little   Social Connections: Unknown (11/8/2023)    Social Connection and Isolation Panel [NHANES]     Frequency of Communication with Friends and Family: More than three times a week     Frequency of Social Gatherings with Friends and Family: Three times a week     Active Member of Clubs or Organizations: Yes     Attends Club or Organization Meetings: 1 to 4 times per year     Marital Status:    Housing Stability: Unknown (11/8/2023)    Housing Stability Vital Sign     Unable to Pay for Housing in the Last Year: No     Unstable Housing in the Last Year: No      Family History   Problem Relation Age of Onset    Heart disease Mother     Heart disease Father     Cancer Sister     Cancer Son         Objective:      Physical Exam  Constitutional:       Appearance: She is well-developed.   HENT:      Head: Normocephalic and atraumatic.   Eyes:      General: No scleral icterus.     Conjunctiva/sclera: Conjunctivae normal.      Pupils: Pupils are equal, round, and reactive to light.   Neck:      Thyroid: No thyroid mass.      Trachea: Trachea normal.   Pulmonary:      Effort: Pulmonary effort is normal.   Musculoskeletal:      Cervical back: Normal range of motion and neck supple.    Neurological:      Mental Status: She is alert and oriented to person, place, and time.   Psychiatric:         Mood and Affect: Mood normal.         Behavior: Behavior normal.         Assessment:     1. Systolic hypertension Active   2. Stage 3b chronic kidney disease Stable   3. OAB (overactive bladder) Poorly controlled     Plan:       PROBLEM LIST     Systolic hypertension    Stage 3b chronic kidney disease    OAB (overactive bladder)    half 10 mg tabs-- take 1/2 tab (5 mg) in morning and 1/2 (5 mg) in evening. Keep BP log and RTC in 4 wk for f/u    For OAB symptoms, start Myrbetriq 25 mg daily (renally dosed due to her CKD)

## 2023-12-13 ENCOUNTER — OFFICE VISIT (OUTPATIENT)
Dept: FAMILY MEDICINE | Facility: CLINIC | Age: 83
End: 2023-12-13
Payer: MEDICARE

## 2023-12-13 VITALS
BODY MASS INDEX: 35.68 KG/M2 | HEIGHT: 64 IN | DIASTOLIC BLOOD PRESSURE: 72 MMHG | WEIGHT: 209 LBS | HEART RATE: 67 BPM | RESPIRATION RATE: 18 BRPM | SYSTOLIC BLOOD PRESSURE: 169 MMHG | OXYGEN SATURATION: 95 %

## 2023-12-13 DIAGNOSIS — E66.01 CLASS 2 SEVERE OBESITY WITH SERIOUS COMORBIDITY AND BODY MASS INDEX (BMI) OF 35.0 TO 35.9 IN ADULT, UNSPECIFIED OBESITY TYPE: Chronic | ICD-10-CM

## 2023-12-13 DIAGNOSIS — N18.32 STAGE 3B CHRONIC KIDNEY DISEASE: Chronic | ICD-10-CM

## 2023-12-13 DIAGNOSIS — R06.81 WITNESSED EPISODE OF APNEA: ICD-10-CM

## 2023-12-13 DIAGNOSIS — R44.2 HYPNAGOGIC HALLUCINATIONS: Chronic | ICD-10-CM

## 2023-12-13 DIAGNOSIS — I10 SYSTOLIC HYPERTENSION: Primary | Chronic | ICD-10-CM

## 2023-12-13 PROBLEM — E78.49 OTHER HYPERLIPIDEMIA: Status: ACTIVE | Noted: 2019-09-23

## 2023-12-13 PROCEDURE — 99214 PR OFFICE/OUTPT VISIT, EST, LEVL IV, 30-39 MIN: ICD-10-PCS | Mod: S$GLB,,, | Performed by: NURSE PRACTITIONER

## 2023-12-13 PROCEDURE — 99214 OFFICE O/P EST MOD 30 MIN: CPT | Mod: S$GLB,,, | Performed by: NURSE PRACTITIONER

## 2023-12-13 RX ORDER — NIFEDIPINE 60 MG/1
60 TABLET, EXTENDED RELEASE ORAL DAILY
Qty: 30 TABLET | Refills: 3 | Status: SHIPPED | OUTPATIENT
Start: 2023-12-13 | End: 2024-01-17 | Stop reason: SDUPTHER

## 2023-12-13 NOTE — PROGRESS NOTES
Subjective:       Patient ID: Iman Wood is a 83 y.o. female.    Chief Complaint: Follow-up (Pt is here for a 4 week follow up and BP check. Pt states her BP has been elevated at home since the last visit. Pt also had a recent ER visit for elevated BP.)    Systolic BP has been trending high of late. SBP has been resistant to amlodipine. She started having sharp pain Rt side of her head on Dec 9; this was not her usual headache. She went to [facility] ED for evaluation. Her BP upon arrival was 209/67; CT of head -- chronic calcified hemangioma frontal lobe and some ischemic microvascular changes. At discharge her BP trended down to 133/63; Discharged with medrol taper and tramadol. No longer having Rt-sided head pain. No neurological changes appreciated.     Daughter notes that her mother appear to take extremely long pauses between breaths when sleeping. When Ms Metcalf was monitored at the hospital, her daughter states that her slowed breathing rate while sleeping would cause the monitors to alarm frequently. Additionally, she has a long history of hypnagogic hallucinations that only occur when falling asleep-- this is not new and has happened intermittently for many years. She is interested in a home sleep study.     She was contacted that Dr Tang is retiring. Last c-scope with Dr Tang was in Sept 2021 and he wanted her to repeat in 3 years. She requests to stay within [facility] network.             Review of Systems   Constitutional:  Negative for chills and fever.   Respiratory:  Negative for cough, chest tightness, shortness of breath and wheezing.    Cardiovascular:  Negative for chest pain and palpitations.   Musculoskeletal:  Negative for myalgias.   Neurological:  Positive for headaches. Negative for dizziness and light-headedness.   Psychiatric/Behavioral:  Positive for sleep disturbance.            Past Medical History:  Past Medical History:   Diagnosis Date    Eye degeneration     left    Hypertension        Past Surgical History:   Procedure Laterality Date    DILATION AND CURETTAGE OF UTERUS      EYE SURGERY      HYSTERECTOMY      TOTAL KNEE ARTHROPLASTY Bilateral       Review of patient's allergies indicates:   Allergen Reactions    Meperidine Nausea Only      Current Outpatient Medications   Medication Sig Dispense Refill    ADULT LOW DOSE ASPIRIN 81 mg EC tablet Take 81 mg by mouth Daily.      albuterol (PROVENTIL HFA) 90 mcg/actuation inhaler Inhale 2 puffs into the lungs every 6 (six) hours as needed for Wheezing or Shortness of Breath. Rescue 8 g 1    allopurinoL (ZYLOPRIM) 100 MG tablet Take 100 mg by mouth once daily.      atorvastatin (LIPITOR) 20 MG tablet TAKE 1 TABLET BY MOUTH EVERYDAY AT BEDTIME 90 tablet 3    azelastine (ASTELIN) 137 mcg (0.1 %) nasal spray SPRAY 1 SPRAY IN EACH NOSTRIL TWICE DAILY 30 mL 3    citalopram (CELEXA) 20 MG tablet Take 1 tablet (20 mg total) by mouth once daily. 90 tablet 3    colchicine (COLCRYS) 0.6 mg tablet Take 2 tablet by mouth now and 1 tablet by mouth 1 hour later as directed. Can repeat dose after 3 days prn gout. 12 tablet 3    ferrous gluconate (FERGON) 324 MG tablet TAKE 1 TABLET BY MOUTH EVERY OTHER DAY WITH BREAKFAST 15 tablet 1    levalbuterol (XOPENEX) 1.25 mg/3 mL nebulizer solution INHALE 1 VIAL VIA NEBULIZER EVERY 6 HOURS AS NEEDED FOR WHEEZING OR SHORTNESS OF BREATH **RESCUE** 360 mL 1    loratadine (CLARITIN) 10 mg tablet TAKE 1 TABLET BY MOUTH EVERY DAY 90 tablet 3    losartan (COZAAR) 100 MG tablet Take 100 mg by mouth Daily.      losartan-hydrochlorothiazide 100-12.5 mg (HYZAAR) 100-12.5 mg Tab Take 1 tablet by mouth once daily.      metoprolol succinate (TOPROL-XL) 50 MG 24 hr tablet TAKE 1 TABLET BY MOUTH EVERY DAY 90 tablet 3    montelukast (SINGULAIR) 10 mg tablet TAKE 1 TABLET BY MOUTH EVERY DAY IN THE EVENING 90 tablet 1    NIFEdipine (PROCARDIA-XL) 60 MG (OSM) 24 hr tablet Take 1 tablet (60 mg total) by mouth once daily. 30 tablet 3     No  current facility-administered medications for this visit.     Social History     Socioeconomic History    Marital status:    Occupational History    Occupation: Retired RN   Tobacco Use    Smoking status: Never    Smokeless tobacco: Never   Substance and Sexual Activity    Alcohol use: Never    Drug use: Not Currently    Sexual activity: Not Currently     Social Determinants of Health     Financial Resource Strain: Low Risk  (11/8/2023)    Overall Financial Resource Strain (CARDIA)     Difficulty of Paying Living Expenses: Not hard at all   Food Insecurity: No Food Insecurity (11/8/2023)    Hunger Vital Sign     Worried About Running Out of Food in the Last Year: Never true     Ran Out of Food in the Last Year: Never true   Transportation Needs: No Transportation Needs (11/8/2023)    PRAPARE - Transportation     Lack of Transportation (Medical): No     Lack of Transportation (Non-Medical): No   Physical Activity: Inactive (11/8/2023)    Exercise Vital Sign     Days of Exercise per Week: 0 days     Minutes of Exercise per Session: 10 min   Stress: No Stress Concern Present (11/8/2023)    [employer] - Occupational Stress Questionnaire     Feeling of Stress : Only a little   Social Connections: Unknown (11/8/2023)    Social Connection and Isolation Panel [NHANES]     Frequency of Communication with Friends and Family: More than three times a week     Frequency of Social Gatherings with Friends and Family: Three times a week     Active Member of Clubs or Organizations: Yes     Attends Club or Organization Meetings: 1 to 4 times per year     Marital Status:    Housing Stability: Unknown (11/8/2023)    Housing Stability Vital Sign     Unable to Pay for Housing in the Last Year: No     Unstable Housing in the Last Year: No      Family History   Problem Relation Age of Onset    Heart disease Mother     Heart disease Father     Cancer Sister     Cancer Son         Objective:       Physical Exam  Constitutional:       Appearance: She is well-developed.   HENT:      Head: Normocephalic and atraumatic.   Eyes:      General: No scleral icterus.     Conjunctiva/sclera: Conjunctivae normal.      Pupils: Pupils are equal, round, and reactive to light.      Comments: Ptosis Rt upper lid   Neck:      Thyroid: No thyroid mass.      Trachea: Trachea normal.   Pulmonary:      Effort: Pulmonary effort is normal.   Musculoskeletal:      Cervical back: Normal range of motion and neck supple.   Neurological:      Mental Status: She is alert and oriented to person, place, and time.   Psychiatric:         Mood and Affect: Mood normal.         Behavior: Behavior normal.         Assessment:     1. Systolic hypertension Active   2. Witnessed episode of apnea    3. Hypnagogic hallucinations Active   4. Stage 3b chronic kidney disease Stable   5. Class 2 severe obesity with serious comorbidity and body mass index (BMI) of 35.0 to 35.9 in adult, unspecified obesity type Active     Plan:       PROBLEM LIST     Systolic hypertension  -     NIFEdipine (PROCARDIA-XL) 60 MG (OSM) 24 hr tablet; Take 1 tablet (60 mg total) by mouth once daily.  Dispense: 30 tablet; Refill: 3    Witnessed episode of apnea  -     Ambulatory referral/consult to Sleep Disorders; Future; Expected date: 12/20/2023    Hypnagogic hallucinations  -     Ambulatory referral/consult to Sleep Disorders; Future; Expected date: 12/20/2023    Stage 3b chronic kidney disease    Class 2 severe obesity with serious comorbidity and body mass index (BMI) of 35.0 to 35.9 in adult, unspecified obesity type        Stop amlodipine; start Procardia XL instead; RTC in 2 wk for BP check.     Arranging consult for suspected ROWAN; she is interested in home sleep study     Reviewed [facility] ED report from 12/9/23 via [employer]

## 2023-12-28 ENCOUNTER — OFFICE VISIT (OUTPATIENT)
Dept: FAMILY MEDICINE | Facility: CLINIC | Age: 83
End: 2023-12-28
Payer: MEDICARE

## 2023-12-28 VITALS
HEART RATE: 91 BPM | RESPIRATION RATE: 18 BRPM | WEIGHT: 209 LBS | SYSTOLIC BLOOD PRESSURE: 144 MMHG | DIASTOLIC BLOOD PRESSURE: 76 MMHG | HEIGHT: 64 IN | OXYGEN SATURATION: 95 % | BODY MASS INDEX: 35.68 KG/M2

## 2023-12-28 DIAGNOSIS — R06.81 WITNESSED EPISODE OF APNEA: ICD-10-CM

## 2023-12-28 DIAGNOSIS — N18.32 STAGE 3B CHRONIC KIDNEY DISEASE: ICD-10-CM

## 2023-12-28 DIAGNOSIS — I10 SYSTOLIC HYPERTENSION: Primary | ICD-10-CM

## 2023-12-28 DIAGNOSIS — Z12.11 COLON CANCER SCREENING: ICD-10-CM

## 2023-12-28 DIAGNOSIS — H61.22 IMPACTED CERUMEN, LEFT EAR: ICD-10-CM

## 2023-12-28 DIAGNOSIS — R44.2 HYPNAGOGIC HALLUCINATIONS: ICD-10-CM

## 2023-12-28 DIAGNOSIS — E66.01 CLASS 2 SEVERE OBESITY WITH SERIOUS COMORBIDITY AND BODY MASS INDEX (BMI) OF 35.0 TO 35.9 IN ADULT, UNSPECIFIED OBESITY TYPE: Chronic | ICD-10-CM

## 2023-12-28 PROCEDURE — 99213 PR OFFICE/OUTPT VISIT, EST, LEVL III, 20-29 MIN: ICD-10-PCS | Mod: S$GLB,,, | Performed by: NURSE PRACTITIONER

## 2023-12-28 PROCEDURE — 99213 OFFICE O/P EST LOW 20 MIN: CPT | Mod: S$GLB,,, | Performed by: NURSE PRACTITIONER

## 2023-12-28 NOTE — PROGRESS NOTES
Subjective:       Patient ID: Iman Wood is a 83 y.o. female.    Chief Complaint: Follow-up (Pt is here for a BP check.)    Systolic BP has been trending high of late. SBP has been resistant to amlodipine. She was recently observed at [facility] for hypertensive urgency with headache. Her BP upon arrival was 209/67; CT of head -- chronic calcified hemangioma frontal lobe and some ischemic microvascular changes. At discharge her BP trended down to 133/63. No longer have the same intensity headaches. At our previous appt her amlodipine was changed to nifedipine. Systolic BP has been trending high... but improving at home. Today, SBP mildly elevated and DBP normal.     She is having problems keeping Lt hearing aid in ear. Upon visualization, she does have complete cerumen impaction Lt ear only. Unable to clear wax by irrigation from Lt ear.     She was contacted that Dr Tang is retiring. Last c-scope with Dr Tang was in Sept 2021 and he wanted her to repeat in 3 years. She requests to stay within [facility] network.     She is scheduled for upcoming sleep study w/ Dr Kim for witnessed apnea. Appt scheduled in June 2024.             Review of Systems        Past Medical History:  Past Medical History:   Diagnosis Date    Eye degeneration     left    Hypertension       Past Surgical History:   Procedure Laterality Date    DILATION AND CURETTAGE OF UTERUS      EYE SURGERY      HYSTERECTOMY      TOTAL KNEE ARTHROPLASTY Bilateral       Review of patient's allergies indicates:   Allergen Reactions    Meperidine Nausea Only      Current Outpatient Medications   Medication Sig Dispense Refill    ADULT LOW DOSE ASPIRIN 81 mg EC tablet Take 81 mg by mouth Daily.      albuterol (PROVENTIL HFA) 90 mcg/actuation inhaler Inhale 2 puffs into the lungs every 6 (six) hours as needed for Wheezing or Shortness of Breath. Rescue 8 g 1    allopurinoL (ZYLOPRIM) 100 MG tablet Take 100 mg by mouth once daily.      atorvastatin  (LIPITOR) 20 MG tablet TAKE 1 TABLET BY MOUTH EVERYDAY AT BEDTIME 90 tablet 3    azelastine (ASTELIN) 137 mcg (0.1 %) nasal spray SPRAY 1 SPRAY IN EACH NOSTRIL TWICE DAILY 30 mL 3    citalopram (CELEXA) 20 MG tablet Take 1 tablet (20 mg total) by mouth once daily. 90 tablet 3    colchicine (COLCRYS) 0.6 mg tablet Take 2 tablet by mouth now and 1 tablet by mouth 1 hour later as directed. Can repeat dose after 3 days prn gout. 12 tablet 3    ferrous gluconate (FERGON) 324 MG tablet TAKE 1 TABLET BY MOUTH EVERY OTHER DAY WITH BREAKFAST 15 tablet 1    levalbuterol (XOPENEX) 1.25 mg/3 mL nebulizer solution INHALE 1 VIAL VIA NEBULIZER EVERY 6 HOURS AS NEEDED FOR WHEEZING OR SHORTNESS OF BREATH **RESCUE** 360 mL 1    loratadine (CLARITIN) 10 mg tablet TAKE 1 TABLET BY MOUTH EVERY DAY 90 tablet 3    losartan (COZAAR) 100 MG tablet Take 100 mg by mouth Daily.      losartan-hydrochlorothiazide 100-12.5 mg (HYZAAR) 100-12.5 mg Tab Take 1 tablet by mouth once daily.      metoprolol succinate (TOPROL-XL) 50 MG 24 hr tablet TAKE 1 TABLET BY MOUTH EVERY DAY 90 tablet 3    montelukast (SINGULAIR) 10 mg tablet TAKE 1 TABLET BY MOUTH EVERY DAY IN THE EVENING 90 tablet 1    NIFEdipine (PROCARDIA-XL) 60 MG (OSM) 24 hr tablet Take 1 tablet (60 mg total) by mouth once daily. 30 tablet 3     No current facility-administered medications for this visit.     Social History     Socioeconomic History    Marital status:    Occupational History    Occupation: Retired RN   Tobacco Use    Smoking status: Never    Smokeless tobacco: Never   Substance and Sexual Activity    Alcohol use: Never    Drug use: Not Currently    Sexual activity: Not Currently     Social Determinants of Health     Financial Resource Strain: Low Risk  (11/8/2023)    Overall Financial Resource Strain (CARDIA)     Difficulty of Paying Living Expenses: Not hard at all   Food Insecurity: No Food Insecurity (11/8/2023)    Hunger Vital Sign     Worried About Running Out of  Food in the Last Year: Never true     Ran Out of Food in the Last Year: Never true   Transportation Needs: No Transportation Needs (11/8/2023)    PRAPARE - Transportation     Lack of Transportation (Medical): No     Lack of Transportation (Non-Medical): No   Physical Activity: Inactive (11/8/2023)    Exercise Vital Sign     Days of Exercise per Week: 0 days     Minutes of Exercise per Session: 10 min   Stress: No Stress Concern Present (11/8/2023)    [place] [employer] - Occupational Stress Questionnaire     Feeling of Stress : Only a little   Social Connections: Unknown (11/8/2023)    Social Connection and Isolation Panel [NHANES]     Frequency of Communication with Friends and Family: More than three times a week     Frequency of Social Gatherings with Friends and Family: Three times a week     Active Member of Clubs or Organizations: Yes     Attends Club or Organization Meetings: 1 to 4 times per year     Marital Status:    Housing Stability: Unknown (11/8/2023)    Housing Stability Vital Sign     Unable to Pay for Housing in the Last Year: No     Unstable Housing in the Last Year: No      Family History   Problem Relation Age of Onset    Heart disease Mother     Heart disease Father     Cancer Sister     Cancer Son         Objective:      Physical Exam    Assessment:     1. Systolic hypertension Sub-optimally controlled   2. Witnessed episode of apnea    3. Hypnagogic hallucinations    4. Impacted cerumen, left ear Active   5. Stage 3b chronic kidney disease    6. Class 2 severe obesity with serious comorbidity and body mass index (BMI) of 35.0 to 35.9 in adult, unspecified obesity type Active   7. Colon cancer screening      Plan:       PROBLEM LIST     Systolic hypertension  Comments:  some improvement after changing amlodipine to nefedipine; continue; RTC 8 weeks for BP check    Witnessed episode of apnea  Comments:  appt for sleep study eval scheduled June 2024    Hypnagogic  hallucinations    Impacted cerumen, left ear  Comments:  unable to clear wax w/ irrigation; encouraged her to use Debrox 1-2 weeks, then RTC for irrigation    Stage 3b chronic kidney disease    Class 2 severe obesity with serious comorbidity and body mass index (BMI) of 35.0 to 35.9 in adult, unspecified obesity type  Comments:  recommend DASH diet and walking 30 min daily    Colon cancer screening  -     Ambulatory referral/consult to General Surgery; Future; Expected date: 01/04/2024            [place] colonoscopy  Lt ear irrigation  Continue nefedipine 60 mg

## 2024-01-10 DIAGNOSIS — J30.2 SEASONAL ALLERGIES: Chronic | ICD-10-CM

## 2024-01-10 RX ORDER — MONTELUKAST SODIUM 10 MG/1
TABLET ORAL
Qty: 90 TABLET | Refills: 1 | Status: SHIPPED | OUTPATIENT
Start: 2024-01-10 | End: 2024-01-17 | Stop reason: SDUPTHER

## 2024-01-11 ENCOUNTER — TELEPHONE (OUTPATIENT)
Dept: FAMILY MEDICINE | Facility: CLINIC | Age: 84
End: 2024-01-11
Payer: MEDICARE

## 2024-01-11 NOTE — TELEPHONE ENCOUNTER
----- Message from Chante Sullivan sent at 1/10/2024 11:42 AM CST -----  Contact: Tea Metcalf is needing a call back in regards to her medication. Please give her a call back at [phone]

## 2024-01-12 ENCOUNTER — TELEPHONE (OUTPATIENT)
Dept: FAMILY MEDICINE | Facility: CLINIC | Age: 84
End: 2024-01-12
Payer: MEDICARE

## 2024-01-12 RX ORDER — CLONIDINE HYDROCHLORIDE 0.1 MG/1
0.1 TABLET ORAL 2 TIMES DAILY PRN
COMMUNITY
Start: 2023-12-28

## 2024-01-12 NOTE — TELEPHONE ENCOUNTER
----- Message from Harika Lou sent at 1/12/2024  9:50 AM CST -----  Contact: Iman  Type:  Patient Returning Call    Who Called:Iman  Who Left Message for Patient:Emery Etienne MA  Does the patient know what this is regarding?:medication concerns   Would the patient rather a call back or a response via [email or web address]? Call back   Best Call Back Number: [phone]  Additional Information:

## 2024-01-12 NOTE — TELEPHONE ENCOUNTER
Advised pt she needs to be taking her Nifedipine daily and take her clonidine TID PRN when her BP > 180. Pt verbalized understanding.

## 2024-01-17 DIAGNOSIS — I10 BENIGN ESSENTIAL HYPERTENSION: ICD-10-CM

## 2024-01-17 DIAGNOSIS — J30.2 SEASONAL ALLERGIES: Chronic | ICD-10-CM

## 2024-01-17 DIAGNOSIS — I10 SYSTOLIC HYPERTENSION: Chronic | ICD-10-CM

## 2024-01-17 RX ORDER — MONTELUKAST SODIUM 10 MG/1
10 TABLET ORAL NIGHTLY
Qty: 90 TABLET | Refills: 1 | Status: SHIPPED | OUTPATIENT
Start: 2024-01-17

## 2024-01-17 RX ORDER — METOPROLOL SUCCINATE 50 MG/1
50 TABLET, EXTENDED RELEASE ORAL DAILY
Qty: 90 TABLET | Refills: 3 | Status: SHIPPED | OUTPATIENT
Start: 2024-01-17 | End: 2024-02-22 | Stop reason: ALTCHOICE

## 2024-01-17 RX ORDER — CITALOPRAM 20 MG/1
20 TABLET, FILM COATED ORAL DAILY
Qty: 90 TABLET | Refills: 3 | Status: SHIPPED | OUTPATIENT
Start: 2024-01-17

## 2024-01-17 RX ORDER — NIFEDIPINE 60 MG/1
60 TABLET, EXTENDED RELEASE ORAL DAILY
Qty: 90 TABLET | Refills: 1 | Status: SHIPPED | OUTPATIENT
Start: 2024-01-17 | End: 2024-01-18

## 2024-01-17 NOTE — TELEPHONE ENCOUNTER
----- Message from Nancy Wiley sent at 1/17/2024  1:32 PM CST -----  Summer w/pill pack calling for status of script for NIFEdipine (PROCARDIA-XL) 60 MG, metoprolol succinate (TOPROL-XL) 50 MG , citalopram (CELEXA) 20 MG and montelukast (SINGULAIR) 10 mg fax sent on 1/16/2024 and she can be reached at [phone] and fax  number [phone].    Thanks,

## 2024-01-18 DIAGNOSIS — I10 SYSTOLIC HYPERTENSION: Chronic | ICD-10-CM

## 2024-01-18 RX ORDER — NIFEDIPINE 60 MG/1
60 TABLET, EXTENDED RELEASE ORAL
Qty: 90 TABLET | Refills: 1 | Status: SHIPPED | OUTPATIENT
Start: 2024-01-18

## 2024-01-23 ENCOUNTER — PATIENT MESSAGE (OUTPATIENT)
Dept: SURGERY | Facility: CLINIC | Age: 84
End: 2024-01-23
Payer: MEDICARE

## 2024-02-09 DIAGNOSIS — D47.2 MGUS (MONOCLONAL GAMMOPATHY OF UNKNOWN SIGNIFICANCE): Primary | ICD-10-CM

## 2024-02-12 LAB
ALBUMIN SERPL BCP-MCNC: 3.1 G/DL (ref 3.4–5)
ALBUMIN/GLOBULIN RATIO: 0.78 RATIO (ref 1.1–1.8)
ALP SERPL-CCNC: 102 U/L (ref 46–116)
ALT SERPL W P-5'-P-CCNC: 12 U/L (ref 12–78)
ANION GAP SERPL CALC-SCNC: 9 MMOL/L (ref 3–11)
ANISOCYTOSIS: NORMAL
AST SERPL-CCNC: 14 U/L (ref 15–37)
BASOPHILS NFR BLD: 0.5 % (ref 0–3)
BILIRUB SERPL-MCNC: 0.3 MG/DL (ref 0–1)
BUN SERPL-MCNC: 25 MG/DL (ref 7–18)
BUN/CREAT SERPL: 19.53 RATIO (ref 7–18)
CALCIUM SERPL-MCNC: 8.9 MG/DL (ref 8.8–10.5)
CHLORIDE SERPL-SCNC: 105 MMOL/L (ref 100–108)
CO2 SERPL-SCNC: 28 MMOL/L (ref 21–32)
CREAT SERPL-MCNC: 1.28 MG/DL (ref 0.55–1.02)
EOSINOPHIL NFR BLD: 6.3 % (ref 1–3)
ERYTHROCYTE [DISTWIDTH] IN BLOOD BY AUTOMATED COUNT: 15.5 % (ref 12.5–18)
GFR ESTIMATION: 40
GLOBULIN: 4 G/DL (ref 2.3–3.5)
GLUCOSE SERPL-MCNC: 108 MG/DL (ref 70–110)
HCT VFR BLD AUTO: 41.9 % (ref 37–47)
HGB BLD-MCNC: 12.6 G/DL (ref 12–16)
HYPOCHROMIA BLD QL SMEAR: NORMAL
LYMPHOCYTES NFR BLD: 16.4 % (ref 25–40)
MCH RBC QN AUTO: 28.6 PG (ref 27–31.2)
MCHC RBC AUTO-ENTMCNC: 30.1 G/DL (ref 31.8–35.4)
MCV RBC AUTO: 95 FL (ref 80–97)
MONOCYTES NFR BLD: 9.3 % (ref 1–15)
NEUTROPHILS # BLD AUTO: 5.26 10*3/UL (ref 1.8–7.7)
NEUTROPHILS NFR BLD: 67.2 % (ref 37–80)
NUCLEATED RED BLOOD CELLS: 0 %
PLATELETS: 230 10*3/UL (ref 142–424)
POTASSIUM SERPL-SCNC: 4 MMOL/L (ref 3.6–5.2)
PROT SERPL-MCNC: 7.1 G/DL (ref 6.4–8.2)
RBC # BLD AUTO: 4.41 10*6/UL (ref 4.2–5.4)
SODIUM BLD-SCNC: 142 MMOL/L (ref 135–145)
WBC # BLD: 7.8 10*3/UL (ref 4.6–10.2)

## 2024-02-14 LAB
ALBUMIN, ELECTROPHORESIS: 3.67 G/DL (ref 3.75–5.01)
ALPHA1 GLOB FLD ELPH-MCNC: 0.4 G/DL (ref 0.19–0.46)
ALPHA2 GLOB FLD ELPH-MCNC: 0.96 G/DL (ref 0.48–1.05)
B-GLOBULIN FLD ELPH-MCNC: 0.95 G/DL (ref 0.48–1.1)
EER PROTEIN ELECTROPHORESIS, SERUM: ABNORMAL
GAMMA GLOB FLD ELPH-MCNC: 0.82 G/DL (ref 0.62–1.51)
IGA SERPL-MCNC: 373 MG/DL (ref 68–408)
IGG SERPL-MCNC: 784 MG/DL (ref 768–1632)
IGM: 16 MG/DL (ref 35–263)
INTERPRETATION SERPL IFE-IMP: ABNORMAL
KAPPA FREE LIGHT CHAINS: 40.64 MG/L (ref 3.3–19.4)
KAPPA/LAMBDA FLC RATIO: 1.44 (ref 0.26–1.65)
LAMBDA LIGHT CHAIN, FREE, SERUM: 28.16 MG/L (ref 5.71–26.3)
MONOCLONAL PROTEIN: ABNORMAL G/DL
TOTAL PROTEIN, ELECTROPHORESIS: 6.8 G/DL (ref 6.3–8.2)

## 2024-02-22 ENCOUNTER — OFFICE VISIT (OUTPATIENT)
Dept: FAMILY MEDICINE | Facility: CLINIC | Age: 84
End: 2024-02-22
Payer: MEDICARE

## 2024-02-22 VITALS
HEIGHT: 64 IN | WEIGHT: 208 LBS | OXYGEN SATURATION: 95 % | SYSTOLIC BLOOD PRESSURE: 146 MMHG | HEART RATE: 67 BPM | DIASTOLIC BLOOD PRESSURE: 80 MMHG | RESPIRATION RATE: 18 BRPM | BODY MASS INDEX: 35.51 KG/M2

## 2024-02-22 DIAGNOSIS — I10 ESSENTIAL HYPERTENSION: Chronic | ICD-10-CM

## 2024-02-22 DIAGNOSIS — D32.9 BENIGN NEOPLASM OF MENINGES: Chronic | ICD-10-CM

## 2024-02-22 DIAGNOSIS — N18.32 STAGE 3B CHRONIC KIDNEY DISEASE: Chronic | ICD-10-CM

## 2024-02-22 DIAGNOSIS — N39.0 ACUTE UTI: ICD-10-CM

## 2024-02-22 DIAGNOSIS — R55 NEAR SYNCOPE: Primary | ICD-10-CM

## 2024-02-22 DIAGNOSIS — I70.0 AORTIC ATHEROSCLEROSIS: Chronic | ICD-10-CM

## 2024-02-22 DIAGNOSIS — E66.01 CLASS 2 SEVERE OBESITY WITH SERIOUS COMORBIDITY AND BODY MASS INDEX (BMI) OF 35.0 TO 35.9 IN ADULT, UNSPECIFIED OBESITY TYPE: Chronic | ICD-10-CM

## 2024-02-22 PROCEDURE — 99213 OFFICE O/P EST LOW 20 MIN: CPT | Mod: S$GLB,,, | Performed by: NURSE PRACTITIONER

## 2024-02-22 RX ORDER — METOPROLOL TARTRATE 50 MG/1
50 TABLET ORAL 2 TIMES DAILY
COMMUNITY
Start: 2024-02-21 | End: 2024-02-28 | Stop reason: SDUPTHER

## 2024-02-22 NOTE — PROGRESS NOTES
Subjective:       Patient ID: Iman Wood is a 83 y.o. female.    Chief Complaint: Follow-up (Pt is here for a hospital follow up for syncope.)    Hospital f/u for near syncope. Observed at [facility] 20-21. She was dehydrated and she was treated for active UTI with Rocephin IV. Cardiologist Dr Pedraza was consulted. Echo revealed mild diastolic dysfunction with EF 65%; carotid doppler US revealed no significant stenosis. CT head revealed no evidence of intracranial hemorrhage and left frontal meningioma consistent w/ past imaging. The only medication adjustment was to her metoprolol. She has transitioned home and HH has been arranged through [facility].       Review of Systems   Constitutional:  Negative for chills and fever.   Respiratory:  Negative for cough, chest tightness, shortness of breath and wheezing.    Cardiovascular:  Negative for chest pain and palpitations.   Musculoskeletal:  Negative for myalgias.   Neurological:  Negative for dizziness and light-headedness.           Past Medical History:  Past Medical History:   Diagnosis Date    Eye degeneration     left    Hypertension       Past Surgical History:   Procedure Laterality Date    DILATION AND CURETTAGE OF UTERUS      EYE SURGERY      HYSTERECTOMY      TOTAL KNEE ARTHROPLASTY Bilateral       Review of patient's allergies indicates:   Allergen Reactions    Meperidine Nausea Only      Current Outpatient Medications   Medication Sig Dispense Refill    ADULT LOW DOSE ASPIRIN 81 mg EC tablet Take 81 mg by mouth Daily.      albuterol (PROVENTIL HFA) 90 mcg/actuation inhaler Inhale 2 puffs into the lungs every 6 (six) hours as needed for Wheezing or Shortness of Breath. Rescue 8 g 1    allopurinoL (ZYLOPRIM) 100 MG tablet Take 100 mg by mouth once daily.      atorvastatin (LIPITOR) 20 MG tablet TAKE 1 TABLET BY MOUTH EVERYDAY AT BEDTIME 90 tablet 3    azelastine (ASTELIN) 137 mcg (0.1 %) nasal spray SPRAY 1 SPRAY IN EACH NOSTRIL TWICE  DAILY 30 mL 3    citalopram (CELEXA) 20 MG tablet Take 1 tablet (20 mg total) by mouth once daily. 90 tablet 3    cloNIDine (CATAPRES) 0.1 MG tablet Take 0.1 mg by mouth 2 (two) times daily as needed (BLOOD PRESSURE > 180).      colchicine (COLCRYS) 0.6 mg tablet Take 2 tablet by mouth now and 1 tablet by mouth 1 hour later as directed. Can repeat dose after 3 days prn gout. 12 tablet 3    ferrous gluconate (FERGON) 324 MG tablet TAKE 1 TABLET BY MOUTH EVERY OTHER DAY WITH BREAKFAST 15 tablet 1    levalbuterol (XOPENEX) 1.25 mg/3 mL nebulizer solution INHALE 1 VIAL VIA NEBULIZER EVERY 6 HOURS AS NEEDED FOR WHEEZING OR SHORTNESS OF BREATH **RESCUE** 360 mL 1    loratadine (CLARITIN) 10 mg tablet TAKE 1 TABLET BY MOUTH EVERY DAY 90 tablet 3    losartan (COZAAR) 100 MG tablet Take 100 mg by mouth Daily.      losartan-hydrochlorothiazide 100-12.5 mg (HYZAAR) 100-12.5 mg Tab Take 1 tablet by mouth once daily.      metoprolol tartrate (LOPRESSOR) 50 MG tablet Take 50 mg by mouth 2 (two) times daily.      montelukast (SINGULAIR) 10 mg tablet Take 1 tablet (10 mg total) by mouth every evening. 90 tablet 1    NIFEdipine (PROCARDIA-XL) 60 MG (OSM) 24 hr tablet Take 1 tablet by mouth once daily. 90 tablet 1     No current facility-administered medications for this visit.     Social History     Socioeconomic History    Marital status:    Occupational History    Occupation: Retired RN   Tobacco Use    Smoking status: Never    Smokeless tobacco: Never   Substance and Sexual Activity    Alcohol use: Never    Drug use: Not Currently    Sexual activity: Not Currently     Social Determinants of Health     Financial Resource Strain: Low Risk  (11/8/2023)    Overall Financial Resource Strain (CARDIA)     Difficulty of Paying Living Expenses: Not hard at all   Food Insecurity: No Food Insecurity (11/8/2023)    Hunger Vital Sign     Worried About Running Out of Food in the Last Year: Never true     Ran Out of Food in the Last  Year: Never true   Transportation Needs: No Transportation Needs (11/8/2023)    PRAPARE - Transportation     Lack of Transportation (Medical): No     Lack of Transportation (Non-Medical): No   Physical Activity: Inactive (11/8/2023)    Exercise Vital Sign     Days of Exercise per Week: 0 days     Minutes of Exercise per Session: 10 min   Stress: No Stress Concern Present (11/8/2023)    [place] [employer] - Occupational Stress Questionnaire     Feeling of Stress : Only a little   Social Connections: Unknown (11/8/2023)    Social Connection and Isolation Panel [NHANES]     Frequency of Communication with Friends and Family: More than three times a week     Frequency of Social Gatherings with Friends and Family: Three times a week     Active Member of Clubs or Organizations: Yes     Attends Club or Organization Meetings: 1 to 4 times per year     Marital Status:    Housing Stability: Unknown (11/8/2023)    Housing Stability Vital Sign     Unable to Pay for Housing in the Last Year: No     Unstable Housing in the Last Year: No      Family History   Problem Relation Age of Onset    Heart disease Mother     Heart disease Father     Cancer Sister     Cancer Son         Objective:      Physical Exam  Constitutional:       Appearance: She is well-developed.   HENT:      Head: Normocephalic and atraumatic.   Eyes:      General: No scleral icterus.     Conjunctiva/sclera: Conjunctivae normal.      Pupils: Pupils are equal, round, and reactive to light.      Comments: Ptosis Rt upper lid   Neck:      Thyroid: No thyroid mass.      Trachea: Trachea normal.   Pulmonary:      Effort: Pulmonary effort is normal.   Musculoskeletal:      Cervical back: Normal range of motion and neck supple.   Neurological:      Mental Status: She is alert and oriented to person, place, and time.   Psychiatric:         Mood and Affect: Mood normal.         Behavior: Behavior normal.         Assessment:     1. Near syncope  Acute   2. Acute UTI Acute   3. Essential hypertension Stable   4. Benign neoplasm of meninges Active   5. Stage 3b chronic kidney disease Stable   6. Class 2 severe obesity with serious comorbidity and body mass index (BMI) of 35.0 to 35.9 in adult, unspecified obesity type Stable   7. Aortic atherosclerosis Stable     Plan:       PROBLEM LIST     Near syncope  Comments:  secondary to UTI; resolved after treatment w/ IV abx    Acute UTI  Comments:  resolved after treatment w/ IV abx; reminded to HYDRATE HYDRATE HYDRATE    Essential hypertension    Benign neoplasm of meninges  Comments:  no significant change in CT 2/20/24 [facility]    Stage 3b chronic kidney disease  Comments:  continue renally dosing all meds    Class 2 severe obesity with serious comorbidity and body mass index (BMI) of 35.0 to 35.9 in adult, unspecified obesity type  Comments:  recommend DASH diet and light walking daily    Aortic atherosclerosis        Reviewed hospital admission notes via [facility]

## 2024-02-26 ENCOUNTER — TELEPHONE (OUTPATIENT)
Dept: FAMILY MEDICINE | Facility: CLINIC | Age: 84
End: 2024-02-26
Payer: MEDICARE

## 2024-02-26 PROCEDURE — G0180 MD CERTIFICATION HHA PATIENT: HCPCS | Mod: ,,, | Performed by: NURSE PRACTITIONER

## 2024-02-26 NOTE — TELEPHONE ENCOUNTER
----- Message from Brooke Anderson sent at 2/26/2024  1:28 PM CST -----  Contact: self  Juan M from [facility] is calling to inform Dr. Ferguson that Iman Wood has been admitted for home care (physical therapy & occupational therapy). For any questions or concerns please call back at [phone]

## 2024-02-28 ENCOUNTER — PATIENT MESSAGE (OUTPATIENT)
Dept: FAMILY MEDICINE | Facility: CLINIC | Age: 84
End: 2024-02-28
Payer: MEDICARE

## 2024-02-29 RX ORDER — METOPROLOL TARTRATE 50 MG/1
50 TABLET ORAL 2 TIMES DAILY
Qty: 180 TABLET | Refills: 3 | Status: SHIPPED | OUTPATIENT
Start: 2024-02-29

## 2024-03-27 ENCOUNTER — OFFICE VISIT (OUTPATIENT)
Dept: PULMONOLOGY | Facility: CLINIC | Age: 84
End: 2024-03-27
Payer: MEDICARE

## 2024-03-27 VITALS
DIASTOLIC BLOOD PRESSURE: 83 MMHG | OXYGEN SATURATION: 95 % | BODY MASS INDEX: 35.51 KG/M2 | RESPIRATION RATE: 18 BRPM | HEART RATE: 59 BPM | HEIGHT: 64 IN | WEIGHT: 208 LBS | SYSTOLIC BLOOD PRESSURE: 146 MMHG

## 2024-03-27 DIAGNOSIS — R06.83 LOUD SNORING: ICD-10-CM

## 2024-03-27 DIAGNOSIS — G47.33 OBSTRUCTIVE SLEEP APNEA: ICD-10-CM

## 2024-03-27 DIAGNOSIS — G47.19 EXCESSIVE DAYTIME SLEEPINESS: Primary | ICD-10-CM

## 2024-03-27 DIAGNOSIS — E66.9 OBESITY, UNSPECIFIED CLASSIFICATION, UNSPECIFIED OBESITY TYPE, UNSPECIFIED WHETHER SERIOUS COMORBIDITY PRESENT: ICD-10-CM

## 2024-03-27 DIAGNOSIS — R44.2 HYPNAGOGIC HALLUCINATIONS: Chronic | ICD-10-CM

## 2024-03-27 DIAGNOSIS — R06.81 WITNESSED EPISODE OF APNEA: ICD-10-CM

## 2024-03-27 PROCEDURE — 99204 OFFICE O/P NEW MOD 45 MIN: CPT | Mod: S$GLB,,, | Performed by: INTERNAL MEDICINE

## 2024-03-27 NOTE — PROGRESS NOTES
Pulmonary Medicine Clinic Note    Patient Identification:   Patient ID: Iman Wood is a 83 y.o. female.    Referring Provider:  Irma Ferguson     Chief Complaint:  Sleep Apnea      History of Present Illness:    Iman Wood is a 83 y.o. female who presents for the evaluation of excessive daytime sleepiness secondary to possible sleep disordered breathing.    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  She complains of excessive daytime sleepiness with an [place] Sleepiness scale score of 18.  She has been told that she snores loudly and witnessed apneas with long pauses have been reported by daughters.  She does have high blood pressure.  She does wake up frequently to urinate at nighttime.  She falls asleep at inappropriate times.  She does take planned naps as well which could be up to 2 hours in duration and does feel fresh after those.  She has a BMI of 35.7 kilos per m2 with a neck circumference of 18 in.  Her stop Bang score is elevated.  She denies tobacco or alcohol use.  She had tonsillectomy earlier.  Her son Geovani had obstructive sleep apnea.  Her Mallampati class is 4.  She denies any sleep paralysis or symptoms consistent with cataplexy.  Recently she was admitted to the hospital for syncope which was thought to be related to dehydration.  She had been an active  throughout her life.  Excessive daytime sleepiness started when she got old around her 80.  She complained of visual hypnagogic hallucination few weeks ago.  This had happened prior to that almost 2 years ago.  Her daughter brought her some melatonin which she took and slept better but had some hangover and blood pressure issues next day.  She did not take any further melatonin.      Review of Systems:  Review of Systems   Constitutional:  Negative for fever, chills, weight loss, weight gain, activity change, appetite change, fatigue, night sweats and weakness.   HENT:  Negative for  nosebleeds, postnasal drip, rhinorrhea, sinus pressure, sore throat, trouble swallowing, voice change, congestion, ear pain and hearing loss.    Eyes:  Negative for redness and itching.   Respiratory:  Positive for apnea, snoring and somnolence. Negative for cough, hemoptysis, sputum production, choking, chest tightness, shortness of breath, wheezing, orthopnea, previous hospitialization due to pulmonary problems, asthma nighttime symptoms, pleurisy, dyspnea on extertion, use of rescue inhaler and Paroxysmal Nocturnal Dyspnea.    Cardiovascular:  Negative for chest pain, palpitations and leg swelling.   Genitourinary:  Negative for difficulty urinating and hematuria.   Endocrine:  Negative for polydipsia, polyphagia, cold intolerance, heat intolerance and polyuria.    Musculoskeletal:  Negative for arthralgias, back pain, gait problem, joint swelling and myalgias.   Skin:  Negative for rash.   Gastrointestinal:  Negative for nausea, vomiting, abdominal pain, abdominal distention and acid reflux.   Neurological:  Negative for dizziness, syncope, weakness, light-headedness and headaches.   Hematological:  Negative for adenopathy. Does not bruise/bleed easily and no excessive bruising.   Psychiatric/Behavioral:  Negative for confusion and sleep disturbance. The patient is not nervous/anxious.          Allergies:   Review of patient's allergies indicates:   Allergen Reactions    Meperidine Nausea Only       Medications:      Past Medical History:      Past Medical History:   Diagnosis Date    Eye degeneration     left    Hypertension        Family History:      Family History   Problem Relation Age of Onset    Heart disease Mother     Heart disease Father     Cancer Sister     Cancer Son         Social History:      Past Surgical History:   Procedure Laterality Date    DILATION AND CURETTAGE OF UTERUS      EYE SURGERY      HYSTERECTOMY      TOTAL KNEE ARTHROPLASTY Bilateral        Physical Exam:  Vitals:    03/27/24  1057   BP: (!) 146/83   Pulse: (!) 59   Resp: 18     Physical Exam   Constitutional: She is oriented to person, place, and time. She appears not cachectic. No distress. She is obese.   HENT:   Head: Normocephalic.   Nose: Nose normal. No mucosal edema. No polyps.   Mouth/Throat: Oropharynx is clear and moist. Normal dentition. No oropharyngeal exudate. Mallampati Score: IV.   Legally blind in left eye   Neck: No JVD present. No tracheal deviation present. No thyromegaly present.   Cardiovascular: Normal rate, regular rhythm, normal heart sounds and intact distal pulses. Exam reveals no gallop and no friction rub.   No murmur heard.  Pulmonary/Chest: Normal expansion, symmetric chest wall expansion, effort normal and breath sounds normal. No stridor. No respiratory distress. She has no decreased breath sounds. She has no wheezes. She has no rhonchi. She has no rales. Chest wall is not dull to percussion. She exhibits no tenderness. Negative for egophony. Negative for tactile fremitus.   Abdominal: Soft. Bowel sounds are normal. She exhibits no distension and no mass. There is no hepatosplenomegaly. There is no abdominal tenderness. There is no rebound and no guarding. No hernia.   Musculoskeletal:         General: No tenderness, deformity or edema. Normal range of motion.      Cervical back: Normal range of motion and neck supple.   Lymphadenopathy: No supraclavicular adenopathy is present.     She has no cervical adenopathy.     She has no axillary adenopathy.   Neurological: She is alert and oriented to person, place, and time. She displays normal reflexes. No cranial nerve deficit. She exhibits normal muscle tone.   Skin: Skin is warm and dry. No rash noted. She is not diaphoretic. No cyanosis or erythema. No pallor. Nails show no clubbing.   Psychiatric: She has a normal mood and affect. Her behavior is normal. Judgment and thought content normal.         Accessory Clinical Data:  Previous Sleep Study findings:   None    ESS of 18    STOPBANG of 7    Do you snore loudly?  Do you feel tired, fatigued or sleepy during the day?  Has anyone observed that you stop breathing in your sleep?  Do you have or been treated for high blood pressure?  BMI:  Age > 50?  Neck circumference > 40 cm:  Gender:     Assessment and Plan:    Problem List Items Addressed This Visit    None  Visit Diagnoses       Excessive daytime sleepiness    -  Primary    Witnessed episode of apnea        Hypnagogic hallucinations  (Chronic)  (Active)      Obstructive sleep apnea        Relevant Orders    Home Sleep Study    Loud snoring        Obesity, unspecified classification, unspecified obesity type, unspecified whether serious comorbidity present               Her clinical account is not consistent with narcolepsy.  Hypnagogic hallucinations can be seen in normal individuals or even due to underlying untreated obstructive sleep apnea.  She probably does have underlying untreated obstructive sleep apnea.  She does take Celexa and clonidine p.r.n.    Patient has very high pre-test probability for at least moderate ROWAN.   She is very symptomatic.  I have discussed with her and her daughter the pathophysiology of ROWAN and the harmful cardiovascular and neurocognitive side effects,   morbidity and mortality associated with untreated sleep disordered breathing.  We discussed the available diagnostic and treatment modalities, their merits and demerits in detail.  She will prefer to get a home sleep study which is appropriate and will be ordered.  Advised not to take any sedative medications, if he really has to take melatonin she should probably take half of what she has an at least 2 hours prior to her bedtime.  She does not drive.  All the questions were answered. She is agreeable to the above plan.     More than 50% of 45 minutes time was spent face-to-face on counseling, in reviewing referring physicians notes, performing appropriate examination including risk  stratification, assessing daytime sleepiness, risk for sleep disordered breathing, counseling and educating the patient regarding the pathophysiology of obstructive sleep apnea, available diagnostic and treatment modalities, importance of compliance, techniques of desensitization etc., ordering appropriate follow-up sleep studies, documenting clinical information in the electronic medical record and care coordination.    No follow-ups on file.  Thank you very much for involving me in the care of this patient.  Please do not hesitate to reach me if you have any further questions or concerns.

## 2024-04-01 ENCOUNTER — DOCUMENT SCAN (OUTPATIENT)
Dept: HOME HEALTH SERVICES | Facility: HOSPITAL | Age: 84
End: 2024-04-01
Payer: MEDICARE

## 2024-04-10 ENCOUNTER — OUTSIDE PLACE OF SERVICE (OUTPATIENT)
Dept: PULMONOLOGY | Facility: CLINIC | Age: 84
End: 2024-04-10
Payer: MEDICARE

## 2024-04-17 ENCOUNTER — DOCUMENTATION ONLY (OUTPATIENT)
Dept: SLEEP MEDICINE | Facility: HOSPITAL | Age: 84
End: 2024-04-17
Payer: MEDICARE

## 2024-04-17 PROCEDURE — 95806 SLEEP STUDY UNATT&RESP EFFT: CPT | Mod: 26,,, | Performed by: INTERNAL MEDICINE

## 2024-04-23 ENCOUNTER — EXTERNAL HOME HEALTH (OUTPATIENT)
Dept: HOME HEALTH SERVICES | Facility: HOSPITAL | Age: 84
End: 2024-04-23
Payer: MEDICARE

## 2024-04-26 ENCOUNTER — TELEPHONE (OUTPATIENT)
Dept: FAMILY MEDICINE | Facility: CLINIC | Age: 84
End: 2024-04-26
Payer: MEDICARE

## 2024-04-26 DIAGNOSIS — R11.2 NAUSEA AND VOMITING, UNSPECIFIED VOMITING TYPE: Primary | ICD-10-CM

## 2024-04-26 RX ORDER — ONDANSETRON 8 MG/1
8 TABLET, ORALLY DISINTEGRATING ORAL EVERY 12 HOURS PRN
Qty: 20 TABLET | Refills: 0 | Status: SHIPPED | OUTPATIENT
Start: 2024-04-26 | End: 2024-05-13

## 2024-04-26 NOTE — TELEPHONE ENCOUNTER
----- Message from Irma Ferguson NP sent at 4/26/2024  8:47 AM CDT -----  Please let her know I sent medication for nausea to her pharmacy.  ----- Message -----  From: Jessi Tierney MA  Sent: 4/26/2024   8:40 AM CDT  To: Irma Ferguson NP      ----- Message -----  From: Makeda Burnham  Sent: 4/26/2024   8:15 AM CDT  To: Phyllis Solis Staff    Patient calling to have medication for nausea called into pharmacy please call her back at [phone]

## 2024-04-29 ENCOUNTER — TELEPHONE (OUTPATIENT)
Dept: FAMILY MEDICINE | Facility: CLINIC | Age: 84
End: 2024-04-29
Payer: MEDICARE

## 2024-04-29 NOTE — TELEPHONE ENCOUNTER
----- Message from Nargis Tio sent at 4/29/2024  8:17 AM CDT -----  Regarding: Sooner Appointment  Contact: patient  Type:  Sooner Apoointment Request    Caller is requesting a sooner appointment.  Caller declined first available appointment listed below.  Caller will not accept being placed on the waitlist and is requesting a message be sent to doctor.  Name of Caller:Iman   When is the first available appointment? May 2024  Symptoms:pain underneath left breast   Would the patient rather a call back or a response via [facility]?  Call back   Best Call Back Number: [phone] (home)     Additional Information:     JAYLA Rhodes

## 2024-04-30 ENCOUNTER — DOCUMENT SCAN (OUTPATIENT)
Dept: HOME HEALTH SERVICES | Facility: HOSPITAL | Age: 84
End: 2024-04-30
Payer: MEDICARE

## 2024-05-13 ENCOUNTER — OFFICE VISIT (OUTPATIENT)
Dept: FAMILY MEDICINE | Facility: CLINIC | Age: 84
End: 2024-05-13
Payer: MEDICARE

## 2024-05-13 VITALS
WEIGHT: 206 LBS | BODY MASS INDEX: 35.17 KG/M2 | RESPIRATION RATE: 16 BRPM | DIASTOLIC BLOOD PRESSURE: 70 MMHG | HEART RATE: 71 BPM | SYSTOLIC BLOOD PRESSURE: 146 MMHG | OXYGEN SATURATION: 98 % | HEIGHT: 64 IN | TEMPERATURE: 98 F

## 2024-05-13 DIAGNOSIS — N18.32 STAGE 3B CHRONIC KIDNEY DISEASE: Chronic | ICD-10-CM

## 2024-05-13 DIAGNOSIS — G89.29 CHRONIC PAIN OF RIGHT KNEE: Primary | Chronic | ICD-10-CM

## 2024-05-13 DIAGNOSIS — S39.011A STRAIN OF ABDOMINAL MUSCLE, INITIAL ENCOUNTER: ICD-10-CM

## 2024-05-13 DIAGNOSIS — M25.561 CHRONIC PAIN OF RIGHT KNEE: Primary | Chronic | ICD-10-CM

## 2024-05-13 PROCEDURE — 99213 OFFICE O/P EST LOW 20 MIN: CPT | Mod: S$GLB,,, | Performed by: NURSE PRACTITIONER

## 2024-05-13 RX ORDER — DICLOFENAC SODIUM 10 MG/G
4 GEL TOPICAL 4 TIMES DAILY
Qty: 450 G | Refills: 3 | Status: SHIPPED | OUTPATIENT
Start: 2024-05-13

## 2024-05-13 NOTE — PROGRESS NOTES
Subjective:       Patient ID: Iman Wood is a 83 y.o. female.    Chief Complaint: Breast Pain (Pt complains of left breast pain, and right knee pain. )    Chronic pain in right knee; this is the same knee she had a TKR in 2013; pain worse when weight bearing and while in bed at night; this is limiting her mobility at home to carry out usual ADLs. Unable to take oral NSAIDs due to her CKD.     Pain beneath left breast, started after she had an episode of vomiting for 3 days.         Review of Systems   Constitutional:  Negative for chills and fever.   Respiratory:  Negative for cough, chest tightness, shortness of breath and wheezing.    Cardiovascular:  Negative for chest pain and palpitations.   Gastrointestinal:  Positive for abdominal pain (LUQ beneath Lt breast). Negative for diarrhea, nausea and vomiting.   Musculoskeletal:  Positive for arthralgias and joint swelling. Negative for myalgias.   Neurological:  Positive for headaches. Negative for dizziness and light-headedness.           Past Medical History:  Past Medical History:   Diagnosis Date    Eye degeneration     left    Hypertension       Past Surgical History:   Procedure Laterality Date    DILATION AND CURETTAGE OF UTERUS      EYE SURGERY      HYSTERECTOMY      TOTAL KNEE ARTHROPLASTY Bilateral       Review of patient's allergies indicates:   Allergen Reactions    Meperidine Nausea Only      Current Outpatient Medications   Medication Sig Dispense Refill    ADULT LOW DOSE ASPIRIN 81 mg EC tablet Take 81 mg by mouth Daily.      allopurinoL (ZYLOPRIM) 100 MG tablet Take 100 mg by mouth once daily.      atorvastatin (LIPITOR) 20 MG tablet TAKE 1 TABLET BY MOUTH EVERYDAY AT BEDTIME 90 tablet 3    citalopram (CELEXA) 20 MG tablet Take 1 tablet (20 mg total) by mouth once daily. 90 tablet 3    cloNIDine (CATAPRES) 0.1 MG tablet Take 0.1 mg by mouth 2 (two) times daily as needed (BLOOD PRESSURE > 180).      colchicine (COLCRYS) 0.6 mg tablet Take 2  tablet by mouth now and 1 tablet by mouth 1 hour later as directed. Can repeat dose after 3 days prn gout. 12 tablet 3    diclofenac sodium (VOLTAREN) 1 % Gel Apply 4 g topically 4 (four) times daily. 450 g 3    loratadine (CLARITIN) 10 mg tablet TAKE 1 TABLET BY MOUTH EVERY DAY 90 tablet 3    losartan (COZAAR) 100 MG tablet Take 100 mg by mouth Daily.      metoprolol tartrate (LOPRESSOR) 50 MG tablet Take 1 tablet (50 mg total) by mouth 2 (two) times daily. 180 tablet 3    montelukast (SINGULAIR) 10 mg tablet Take 1 tablet (10 mg total) by mouth every evening. 90 tablet 1    NIFEdipine (PROCARDIA-XL) 60 MG (OSM) 24 hr tablet Take 1 tablet by mouth once daily. 90 tablet 1     No current facility-administered medications for this visit.     Social History     Socioeconomic History    Marital status:    Occupational History    Occupation: Retired RN   Tobacco Use    Smoking status: Never    Smokeless tobacco: Never   Substance and Sexual Activity    Alcohol use: Never    Drug use: Not Currently    Sexual activity: Not Currently     Social Determinants of Health     Financial Resource Strain: Low Risk  (11/8/2023)    Overall Financial Resource Strain (CARDIA)     Difficulty of Paying Living Expenses: Not hard at all   Food Insecurity: No Food Insecurity (11/8/2023)    Hunger Vital Sign     Worried About Running Out of Food in the Last Year: Never true     Ran Out of Food in the Last Year: Never true   Transportation Needs: No Transportation Needs (11/8/2023)    PRAPARE - Transportation     Lack of Transportation (Medical): No     Lack of Transportation (Non-Medical): No   Physical Activity: Inactive (11/8/2023)    Exercise Vital Sign     Days of Exercise per Week: 0 days     Minutes of Exercise per Session: 10 min   Stress: No Stress Concern Present (11/8/2023)    [employer] - Occupational Stress Questionnaire     Feeling of Stress : Only a little   Housing Stability: Unknown  (11/8/2023)    Housing Stability Vital Sign     Unable to Pay for Housing in the Last Year: No     Unstable Housing in the Last Year: No      Family History   Problem Relation Name Age of Onset    Heart disease Mother      Heart disease Father      Cancer Sister      Cancer Son          Objective:      Physical Exam  Constitutional:       Appearance: She is well-developed.   HENT:      Head: Normocephalic and atraumatic.   Eyes:      General: No scleral icterus.     Conjunctiva/sclera: Conjunctivae normal.      Pupils: Pupils are equal, round, and reactive to light.      Comments: Ptosis Rt upper lid   Neck:      Thyroid: No thyroid mass.      Trachea: Trachea normal.   Pulmonary:      Effort: Pulmonary effort is normal.   Abdominal:      General: Bowel sounds are normal.      Palpations: Abdomen is soft.      Tenderness: There is abdominal tenderness in the left upper quadrant. There is no left CVA tenderness, guarding or rebound.   Musculoskeletal:      Cervical back: Normal range of motion and neck supple.        Legs:       Comments: Old surgical scar; area of pain indicated in red, mild swelling   Neurological:      Mental Status: She is alert and oriented to person, place, and time.   Psychiatric:         Mood and Affect: Mood normal.         Behavior: Behavior normal.         Assessment:     1. Chronic pain of right knee Active   2. Strain of abdominal muscle, initial encounter Acute   3. Stage 3b chronic kidney disease Stable     Plan:       PROBLEM LIST     Chronic pain of right knee  Comments:  arranging ortho eval  Orders:  -     diclofenac sodium (VOLTAREN) 1 % Gel; Apply 4 g topically 4 (four) times daily.  Dispense: 450 g; Refill: 3  -     Ambulatory referral/consult to Orthopedics; Future; Expected date: 05/20/2024    Strain of abdominal muscle, initial encounter  Comments:  apply diclofenac gel over site of strained muscles; Continue to avoid oral NSAIDS    Stage 3b chronic kidney disease

## 2024-05-28 ENCOUNTER — TELEPHONE (OUTPATIENT)
Dept: FAMILY MEDICINE | Facility: CLINIC | Age: 84
End: 2024-05-28
Payer: MEDICARE

## 2024-05-28 NOTE — TELEPHONE ENCOUNTER
----- Message from Akosua Mirza sent at 5/28/2024  8:36 AM CDT -----  Contact: Nancy(daughter)  Nancy called to consult with nurse or staff regarding the patients recent visit. She states they were told to contact the office if they didn't hear from anyone regarding the referral for orthopedics. She states its been about 2 weeks and wanted to notify the office. Nancy would like a call back regarding this and can be reached at [phone]. Thanks/MR

## 2024-05-29 ENCOUNTER — OUTSIDE PLACE OF SERVICE (OUTPATIENT)
Dept: PULMONOLOGY | Facility: CLINIC | Age: 84
End: 2024-05-29
Payer: MEDICARE

## 2024-06-03 ENCOUNTER — DOCUMENTATION ONLY (OUTPATIENT)
Dept: SLEEP MEDICINE | Facility: HOSPITAL | Age: 84
End: 2024-06-03
Payer: MEDICARE

## 2024-06-03 PROCEDURE — 95811 POLYSOM 6/>YRS CPAP 4/> PARM: CPT | Mod: 26,,, | Performed by: INTERNAL MEDICINE

## 2024-06-07 ENCOUNTER — TELEPHONE (OUTPATIENT)
Dept: PRIMARY CARE CLINIC | Facility: CLINIC | Age: 84
End: 2024-06-07
Payer: MEDICARE

## 2024-06-07 ENCOUNTER — TELEPHONE (OUTPATIENT)
Dept: PULMONOLOGY | Facility: CLINIC | Age: 84
End: 2024-06-07
Payer: MEDICARE

## 2024-06-07 VITALS — DIASTOLIC BLOOD PRESSURE: 70 MMHG | SYSTOLIC BLOOD PRESSURE: 136 MMHG

## 2024-06-07 NOTE — TELEPHONE ENCOUNTER
Return call and spoke with Donna answered all questions. LB  ----- Message from Rebel Yo sent at 6/7/2024  9:28 AM CDT -----  Contact: donna/ daughter  Donna is calling regarding a missed call.  Please give her a call back at [phone]

## 2024-06-19 ENCOUNTER — PATIENT OUTREACH (OUTPATIENT)
Dept: ADMINISTRATIVE | Facility: HOSPITAL | Age: 84
End: 2024-06-19
Payer: MEDICARE

## 2024-06-19 DIAGNOSIS — M85.9 DISORDER OF BONE DENSITY AND STRUCTURE, UNSPECIFIED: ICD-10-CM

## 2024-06-19 DIAGNOSIS — M16.0 PRIMARY OSTEOARTHRITIS OF BOTH HIPS: ICD-10-CM

## 2024-06-19 DIAGNOSIS — M17.0 PRIMARY OSTEOARTHRITIS OF BOTH KNEES: Primary | ICD-10-CM

## 2024-06-19 DIAGNOSIS — M81.0 OSTEOPOROSIS, POSTMENOPAUSAL: ICD-10-CM

## 2024-06-19 DIAGNOSIS — Z13.820 OSTEOPOROSIS SCREENING: Primary | ICD-10-CM

## 2024-06-19 DIAGNOSIS — M85.88 OTHER SPECIFIED DISORDERS OF BONE DENSITY AND STRUCTURE, OTHER SITE: ICD-10-CM

## 2024-06-19 NOTE — PROGRESS NOTES
Replying to Campaign Questionnaire for Overdue HM: DEXA    Message sent to PCP for Dexa orders  Reminder set x 1 week

## 2024-07-01 DIAGNOSIS — I10 SYSTOLIC HYPERTENSION: Chronic | ICD-10-CM

## 2024-07-03 RX ORDER — NIFEDIPINE 60 MG/1
60 TABLET, EXTENDED RELEASE ORAL DAILY
Qty: 90 TABLET | Refills: 3 | Status: SHIPPED | OUTPATIENT
Start: 2024-07-03

## 2024-07-08 ENCOUNTER — OFFICE VISIT (OUTPATIENT)
Dept: URGENT CARE | Facility: CLINIC | Age: 84
End: 2024-07-08
Payer: MEDICARE

## 2024-07-08 ENCOUNTER — TELEPHONE (OUTPATIENT)
Dept: FAMILY MEDICINE | Facility: CLINIC | Age: 84
End: 2024-07-08
Payer: MEDICARE

## 2024-07-08 VITALS
OXYGEN SATURATION: 96 % | HEART RATE: 61 BPM | DIASTOLIC BLOOD PRESSURE: 76 MMHG | TEMPERATURE: 98 F | BODY MASS INDEX: 35.17 KG/M2 | RESPIRATION RATE: 16 BRPM | SYSTOLIC BLOOD PRESSURE: 182 MMHG | HEIGHT: 64 IN | WEIGHT: 206 LBS

## 2024-07-08 DIAGNOSIS — R51.9 ACUTE INTRACTABLE HEADACHE, UNSPECIFIED HEADACHE TYPE: Primary | ICD-10-CM

## 2024-07-08 DIAGNOSIS — I10 PRIMARY HYPERTENSION: ICD-10-CM

## 2024-07-08 DIAGNOSIS — M54.2 NECK PAIN: ICD-10-CM

## 2024-07-08 PROCEDURE — 99213 OFFICE O/P EST LOW 20 MIN: CPT | Mod: S$GLB,,, | Performed by: STUDENT IN AN ORGANIZED HEALTH CARE EDUCATION/TRAINING PROGRAM

## 2024-07-08 RX ORDER — METHOCARBAMOL 750 MG/1
750 TABLET, FILM COATED ORAL 3 TIMES DAILY PRN
Qty: 30 TABLET | Refills: 0 | Status: SHIPPED | OUTPATIENT
Start: 2024-07-08 | End: 2024-07-18

## 2024-07-08 RX ORDER — SUMATRIPTAN SUCCINATE 25 MG/1
50 TABLET ORAL
Qty: 10 TABLET | Refills: 0 | Status: SHIPPED | OUTPATIENT
Start: 2024-07-08 | End: 2024-08-07

## 2024-07-08 NOTE — PROGRESS NOTES
"Subjective:      Patient ID: Iman Wood is a 83 y.o. female.    Vitals:  height is 5' 4" (1.626 m) and weight is 93.4 kg (206 lb). Her temperature is 98.1 °F (36.7 °C). Her blood pressure is 182/76 (abnormal) and her pulse is 61. Her respiration is 16 and oxygen saturation is 96%.     Chief Complaint: Headache    Patient complaints: severe headache x 3 days,yesterday HA got worse,   - posterior neck/head pain that radiates up her head  -hx of ocular stroke in 2020  -hospitalized in 04/2024 for syncope due to high blood pressure and dehydration  -bilateral leg heaviness  -taking tylenol and tramadol with no relief, she hit her head on the island in the kitchen about 4 days, no falls,   No weakness in arms, not slurring words or trying to find the right words to say,   Any head positions  The pain starts in her neck and then moves up the head  The heaviness in the legs is not new, she is going to PT to get stronger    Headache   This is a new problem. The current episode started in the past 7 days. The problem occurs constantly. The problem has been gradually worsening. The pain is located in the Occipital region. The pain quality is not similar to prior headaches. The quality of the pain is described as throbbing. The pain is at a severity of 10/10. The pain is severe. Associated symptoms include photophobia and weakness (leg). Pertinent negatives include no dizziness, loss of balance, nausea, numbness, phonophobia, sinus pressure, visual change or vomiting. She has tried NSAIDs and oral narcotics (tramadol) for the symptoms. The treatment provided no relief. Her past medical history is significant for hypertension and recent head traumas. There is no history of migraine headaches.       HENT:  Negative for sinus pressure.    Cardiovascular:  Negative for chest pain.   Eyes:  Positive for photophobia.   Respiratory:  Negative for shortness of breath.    Gastrointestinal:  Negative for nausea and vomiting. "   Neurological:  Positive for headaches. Negative for dizziness, speech difficulty, coordination disturbances, loss of balance, history of migraines, numbness and tingling.      Objective:     Physical Exam   Constitutional: She appears distressed (the lights are making the headache worse).   HENT:   Head: Normocephalic and atraumatic.   Ears:   Right Ear: impacted cerumen  Left Ear: impacted cerumen  Nose: Nose normal.   Mouth/Throat: Mucous membranes are moist. Oropharynx is clear.   Eyes: Conjunctivae are normal. Pupils are equal, round, and reactive to light.      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 pain with movement present. muscular tenderness present.   Cardiovascular: Normal rate, regular rhythm and normal heart sounds.   Pulmonary/Chest: Effort normal and breath sounds normal.   Musculoskeletal:      Cervical back: She exhibits tenderness.   Neurological: She is alert. She has normal motor skills, normal sensation and intact cranial nerves (2-12). Coordination normal.      Comments: Bilateral upper and lower extremity is 5/5  Blind in left eye   Psychiatric: Her behavior is normal. Mood normal.       Assessment:     1. Acute intractable headache, unspecified headache type    2. Neck pain    3. Primary hypertension        Plan:       Acute intractable headache, unspecified headache type  -     sumatriptan (IMITREX) 25 MG Tab; Take 2 tablets (50 mg total) by mouth every 6 to 8 hours as needed (headache). Do not exceed more than 4 pills in 24 hours  Dispense: 10 tablet; Refill: 0  -     Refer to Emergency Dept.    Neck pain  -     methocarbamoL (ROBAXIN) 750 MG Tab; Take 1 tablet (750 mg total) by mouth 3 (three) times daily as needed (neck pain).  Dispense: 30 tablet; Refill: 0  -     Refer to Emergency Dept.    Primary hypertension    Please follow up with  your primary care provider within 2-5 days if your signs and symptoms have not resolved or worsen.     If your condition worsens or fails to improve we recommend that you receive another evaluation at the emergency room immediately or contact your primary medical clinic to discuss your concerns.   You must understand that you have received an Urgent Care treatment only and that you may be released before all of your medical problems are known or treated. You, the patient, will arrange for follow up care as instructed.        Please go to the [facility] ER to get your head CT done. I have sent the muscle relaxer and the migraine medication to the pharmacy. We will call tomorrow to check on you.     Talked to Zarina ER triage nurse at [facility] at 546pm  Medical Decision Making:   Differential Diagnosis:   Acute intractable headache 2/2 to HTN vs brain bleed vs migraine vs tension headache  Urgent Care Management:  Patient complaints: severe headache x 3 days,yesterday HA got worse,   - posterior neck/head pain that radiates up her head  -hx of ocular stroke in 2020  -hospitalized in 04/2024 for syncope due to high blood pressure and dehydration  -taking tylenol and tramadol with no relief, she hit her head [place] the [place] in the kitchen about 4 days, no falls,   No weakness in arms, not slurring words or trying to find the right words to say,   Any head positions  The pain starts in her neck and then moves up the head  The heaviness in the legs is not new, she is going to PT to get stronger.  Vitals WNL except BP of 175/75.   Physical Exam   Constitutional: She appears distressed (the lights are making the headache worse).   HENT:   Head: Normocephalic and atraumatic.   Ears:   Right Ear: impacted cerumen  Left Ear: impacted cerumen  Nose: Nose normal.   Mouth/Throat: Mucous membranes are moist. Oropharynx is clear.   Eyes: Conjunctivae are normal. Pupils are equal, round, and reactive to light.      Comments:  Blind in left eye   Neck:           Comments: Tenderness to palpation of posterior part of the neck  Pain with moving the neck left and right decreased range of motion present. pain with movement present. muscular tenderness present.   Cardiovascular: Normal rate, regular rhythm and normal heart sounds.   Pulmonary/Chest: Effort normal and breath sounds normal.   Musculoskeletal:      Cervical back: She exhibits tenderness.   Neurological: She is alert. She has normal motor skills, normal sensation and intact cranial nerves (2-12). Coordination normal.      Comments: Bilateral upper and lower extremity is 5/5  Blind in left eye   Psychiatric: Her behavior is normal. Mood normal.   Due to the patient's age, possible head trauma, and headache worsening with changes in positions, I explained that with these red flag symptoms that she would need to go the ER for a head CT. I still send out a medication for her migraine and a muscle relaxer. The daughter would drive the patient to [facility]. The nurse at [facility] ER was given report.

## 2024-07-08 NOTE — PATIENT INSTRUCTIONS
Please follow up with your primary care provider within 2-5 days if your signs and symptoms have not resolved or worsen.     If your condition worsens or fails to improve we recommend that you receive another evaluation at the emergency room immediately or contact your primary medical clinic to discuss your concerns.   You must understand that you have received an Urgent Care treatment only and that you may be released before all of your medical problems are known or treated. You, the patient, will arrange for follow up care as instructed.        Please go to the [facility] ER to get your head CT done. I have sent the muscle relaxer and the migraine medication to the pharmacy. We will call tomorrow to check on you.

## 2024-07-08 NOTE — TELEPHONE ENCOUNTER
----- Message from Meenu Land sent at 7/8/2024  3:19 PM CDT -----  Contact: self  Type:  Same Day Appointment Request    Caller is requesting a same day appointment.  Caller declined first available appointment listed below.    Name of Caller:Iman Peacock Israel  When is the first available appointment?07/17/2024  Symptoms:extreme headache, radiating from back of neck to top of head  Best Call Back Number:[phone]  Additional Information: x 2 days, B/p ok

## 2024-07-09 ENCOUNTER — TELEPHONE (OUTPATIENT)
Dept: URGENT CARE | Facility: CLINIC | Age: 84
End: 2024-07-09
Payer: MEDICARE

## 2024-07-09 NOTE — TELEPHONE ENCOUNTER
The patient states that she is doing better. She still a little bit of neck pain but is taking the muscle relaxer as prescribed. All questions were answered. Her CT head was negative.

## 2024-07-15 DIAGNOSIS — I10 SYSTOLIC HYPERTENSION: Chronic | ICD-10-CM

## 2024-07-16 RX ORDER — NIFEDIPINE 60 MG/1
60 TABLET, EXTENDED RELEASE ORAL DAILY
Qty: 90 TABLET | Refills: 3 | Status: SHIPPED | OUTPATIENT
Start: 2024-07-16

## 2024-07-16 RX ORDER — NIFEDIPINE 60 MG/1
60 TABLET, EXTENDED RELEASE ORAL DAILY
Qty: 90 TABLET | Refills: 3 | OUTPATIENT
Start: 2024-07-16

## 2024-07-16 NOTE — TELEPHONE ENCOUNTER
----- Message from Makeda Burnham sent at 7/16/2024  9:27 AM CDT -----  Patient daughter is calling in regards to medication NIFEdipine (PROCARDIA-XL) 60 MG (OSM) 24 hr tablet several message for medicine called in for approval patient is using a pill pack service and theres a deadline.

## 2024-07-29 DIAGNOSIS — J30.2 SEASONAL ALLERGIES: Chronic | ICD-10-CM

## 2024-07-30 RX ORDER — MONTELUKAST SODIUM 10 MG/1
10 TABLET ORAL NIGHTLY
Qty: 90 TABLET | Refills: 0 | Status: SHIPPED | OUTPATIENT
Start: 2024-07-30

## 2024-09-05 ENCOUNTER — OFFICE VISIT (OUTPATIENT)
Dept: PULMONOLOGY | Facility: CLINIC | Age: 84
End: 2024-09-05
Payer: MEDICARE

## 2024-09-05 VITALS
OXYGEN SATURATION: 95 % | RESPIRATION RATE: 18 BRPM | BODY MASS INDEX: 36.6 KG/M2 | WEIGHT: 214.38 LBS | HEIGHT: 64 IN | HEART RATE: 67 BPM

## 2024-09-05 DIAGNOSIS — G47.30 SLEEP APNEA TREATED WITH NOCTURNAL BIPAP: Primary | ICD-10-CM

## 2024-09-05 PROCEDURE — 99214 OFFICE O/P EST MOD 30 MIN: CPT | Mod: S$GLB,,,

## 2024-09-05 NOTE — ASSESSMENT & PLAN NOTE
We will send an order to [facility] for a proper mask fitting.  Would recommend using a fullface mask with a foam liner so that it is less irritating to the skin.  She is doing great as far as compliance goes and her AHI is less than 10.  Follow up in 3 months with another compliance report.

## 2024-09-05 NOTE — PROGRESS NOTES
Subjective:    Patient Identification:  Patient ID: Iman Wood is a 83 y.o. female.    Referring Provider:  No ref. provider found     Chief Complaint:  Sleep Apnea      History of Present Illness:    Iman Wood is a 83 y.o. female who presents for a follow-up visit after an unsuccessful CPAP titration study.  She was started on trial of BiPAP 20/8 with a pressure support of 4 cm H2O using an AirFit F10 size small fullface mask.  There is a compliance report for dates ranging 6-14 24-8 26-24 showing 100% compliance with an average AHI of 6.2 there is a significant amount of leakage on this compliance report.  Her ESS today is 13.  She says that she does notice that her mask is leaking and she has to wake up and readjusted.  She also states that the silicone irritates her skin.    Review of Systems:  Review of Systems   Constitutional:  Negative for fever, chills, appetite change, night sweats and weakness.   HENT:  Negative for postnasal drip, rhinorrhea, sore throat, trouble swallowing, voice change, congestion and ear pain.    Respiratory:  Negative for snoring, hemoptysis, sputum production, shortness of breath, wheezing, previous hospitialization due to pulmonary problems, pleurisy and use of rescue inhaler.    Cardiovascular:  Negative for chest pain, palpitations and leg swelling.   Genitourinary:  Negative for difficulty urinating and hematuria.   Endocrine:  Negative for polydipsia, polyphagia, cold intolerance, heat intolerance and polyuria.    Musculoskeletal:  Negative for joint swelling and myalgias.   Skin:  Negative for rash.   Gastrointestinal:  Negative for nausea, vomiting, abdominal pain and abdominal distention.   Neurological:  Negative for dizziness, syncope, light-headedness and headaches.   Psychiatric/Behavioral:  Negative for confusion and sleep disturbance. The patient is not nervous/anxious.        Allergies:   Review of patient's allergies indicates:   Allergen Reactions  "   Meperidine Nausea Only       Medications:      Past Medical History:      Past Medical History:   Diagnosis Date    Eye degeneration     left    Hypertension        Family History:      Family History   Problem Relation Name Age of Onset    Heart disease Mother      Heart disease Father      Cancer Sister      Cancer Son          Social History:      Past Surgical History:   Procedure Laterality Date    DILATION AND CURETTAGE OF UTERUS      EYE SURGERY      HYSTERECTOMY      TOTAL KNEE ARTHROPLASTY Bilateral        Physical Exam:  Pulse 67   Resp 18   Ht 5' 4" (1.626 m)   Wt 97.3 kg (214 lb 6.4 oz)   SpO2 95%   BMI 36.80 kg/m²   Physical Exam   Constitutional: She is oriented to person, place, and time. She appears not cachectic. No distress.   HENT:   Head: Normocephalic.   Right Ear: External ear normal.   Left Ear: External ear normal.   Nose: Nose normal. No mucosal edema. No polyps.   Mouth/Throat: Oropharynx is clear and moist. Normal dentition. No oropharyngeal exudate. Mallampati Score: III.   Neck: No JVD present. No tracheal deviation present. No thyromegaly present.   Cardiovascular: Normal rate, regular rhythm, normal heart sounds and intact distal pulses. Exam reveals no gallop and no friction rub.   No murmur heard.  Pulmonary/Chest: Normal expansion, symmetric chest wall expansion, effort normal and breath sounds normal. No stridor. No respiratory distress. She has no decreased breath sounds. She has no wheezes. She has no rhonchi. She has no rales. Chest wall is not dull to percussion. She exhibits no tenderness. Negative for egophony. Negative for tactile fremitus.   Abdominal: Soft. Bowel sounds are normal. She exhibits no distension and no mass. There is no hepatosplenomegaly. There is no abdominal tenderness. There is no rebound and no guarding. No hernia.   Musculoskeletal:         General: No tenderness, deformity or edema. Normal range of motion.      Cervical back: Normal range of " "motion and neck supple.   Lymphadenopathy: No supraclavicular adenopathy is present.     She has no cervical adenopathy.     She has no axillary adenopathy.   Neurological: She is alert and oriented to person, place, and time. She has normal reflexes. She displays normal reflexes. No cranial nerve deficit. She exhibits normal muscle tone.   Skin: Skin is warm and dry. No rash noted. She is not diaphoretic. No cyanosis or erythema. No pallor. Nails show no clubbing.   Psychiatric: She has a normal mood and affect. Her behavior is normal. Judgment and thought content normal.           No results found for: "PREFEV1", "RXS2LXYDQX", "PREFVC", "FVCPREREF", "JZHFVL7XNY", "MJX2KIROSWR", "VAOH3VTN", "KLKT8VPH", "PREDLCO", "DLCOSBPRRF", "DLCOADJPRE", "DLCOCSBRPRRF", "POSTFEV1", "POSTFVC", "PXOHXAM0RNV"   X-Ray Chest PA And Lateral                                RADIOLOGY REPORT        PT NAME: IOANA CLOUD      [facility]     : 1940 F 82             [address] [facility]    ACCT: [id number]                                              [facility] Rec #: [id number]                                        [id number]    Patient Location: AL.RAD/             Procedure: CHEST 2 VIEWS    REQUISITION #: [id number]      REPORT #: [id number]           DATE OF EXAM: 23    TIME OF EXAM:        CHEST 2 VIEWS        CLINICAL INDICATION:  Acute cough for 4 days        PA and lateral views of the chest were obtained. The cardiac size is normal.   The mediastinal and hilar structures are normal. The lungs are clear and   normally inflated. The osseous structures are intact.        IMPRESSION:        No acute disease.        Electronically signed by: Luana Prakash MD (3/9/2023 12:01 PM)        DICTATING PHYSICIAN:  LUANA ODELL MD                   Date Dictated: 23 1045        Signed By:  LUANA ODELL MD     Date Signed:  23 1204     CC: GILSON NEGRO ; " BHAKTI TODD      ADMITTING PHYSICIAN:                                                                                                    ORDERING PHY: GILSON NEGRO                                                                                                                                                      ATTENDING BILLIEY: GILSON NEGRO    Patient Status:  REG CLI    Admit Service Date: 03/09/23                Accessory Clinical Data:  Chest x-ray:    CT scan:     PFTs:     6MWT:     TTE:    Lab data:    All radiographic imaging of the chest, PFT tracings/data, and 6MWT data have been independently reviewed and interpreted unless otherwise specified.     Assessment and Plan:      Problem List Items Addressed This Visit          Other    Sleep apnea treated with nocturnal BiPAP - Primary    Current Assessment & Plan     We will send an order to [facility] for a proper mask fitting.  Would recommend using a fullface mask with a foam liner so that it is less irritating to the skin.  She is doing great as far as compliance goes and her AHI is less than 10.  Follow up in 3 months with another compliance report.           No orders of the defined types were placed in this encounter.           Follow up in about 3 months (around 12/5/2024).

## 2024-09-19 ENCOUNTER — PATIENT MESSAGE (OUTPATIENT)
Dept: FAMILY MEDICINE | Facility: CLINIC | Age: 84
End: 2024-09-19
Payer: MEDICARE

## 2024-09-19 DIAGNOSIS — S62.101A CLOSED FRACTURE OF RIGHT WRIST, INITIAL ENCOUNTER: Primary | ICD-10-CM

## 2024-09-19 DIAGNOSIS — S62.101B OPEN FRACTURE OF RIGHT WRIST, INITIAL ENCOUNTER: ICD-10-CM

## 2024-09-19 RX ORDER — HYDROCODONE BITARTRATE AND ACETAMINOPHEN 5; 325 MG/1; MG/1
1 TABLET ORAL EVERY 4 HOURS PRN
Qty: 21 TABLET | Refills: 0 | Status: SHIPPED | OUTPATIENT
Start: 2024-09-19 | End: 2024-09-19

## 2024-09-20 RX ORDER — HYDROCODONE BITARTRATE AND ACETAMINOPHEN 5; 325 MG/1; MG/1
1 TABLET ORAL EVERY 6 HOURS PRN
Qty: 21 TABLET | Refills: 0 | Status: SHIPPED | OUTPATIENT
Start: 2024-09-20

## 2024-09-24 ENCOUNTER — PATIENT MESSAGE (OUTPATIENT)
Dept: FAMILY MEDICINE | Facility: CLINIC | Age: 84
End: 2024-09-24
Payer: MEDICARE

## 2024-10-22 DIAGNOSIS — J30.2 SEASONAL ALLERGIES: Chronic | ICD-10-CM

## 2024-10-22 RX ORDER — MONTELUKAST SODIUM 10 MG/1
10 TABLET ORAL NIGHTLY
Qty: 90 TABLET | Refills: 1 | Status: SHIPPED | OUTPATIENT
Start: 2024-10-22

## 2024-10-23 ENCOUNTER — PATIENT MESSAGE (OUTPATIENT)
Dept: FAMILY MEDICINE | Facility: CLINIC | Age: 84
End: 2024-10-23
Payer: MEDICARE

## 2024-12-16 ENCOUNTER — OFFICE VISIT (OUTPATIENT)
Dept: PULMONOLOGY | Facility: CLINIC | Age: 84
End: 2024-12-16
Payer: MEDICARE

## 2024-12-16 VITALS
SYSTOLIC BLOOD PRESSURE: 166 MMHG | WEIGHT: 210.38 LBS | HEIGHT: 64 IN | DIASTOLIC BLOOD PRESSURE: 73 MMHG | OXYGEN SATURATION: 92 % | HEART RATE: 56 BPM | BODY MASS INDEX: 35.92 KG/M2 | RESPIRATION RATE: 16 BRPM

## 2024-12-16 DIAGNOSIS — G47.30 SLEEP APNEA TREATED WITH NOCTURNAL BIPAP: Primary | ICD-10-CM

## 2024-12-16 PROCEDURE — 99213 OFFICE O/P EST LOW 20 MIN: CPT | Mod: S$PBB,,, | Performed by: PHYSICIAN ASSISTANT

## 2024-12-16 RX ORDER — CHOLECALCIFEROL (VITAMIN D3) 25 MCG
1000 TABLET ORAL DAILY
COMMUNITY

## 2024-12-16 NOTE — PROGRESS NOTES
Pulmonary Medicine Clinic Note    Patient Identification:   Patient ID: Iman Wood is a 84 y.o. female.    Referring Provider:  No ref. provider found     Chief Complaint:  Sleep Apnea      History of Present Illness:    Iman Wood is a 84 y.o. female who presents for three-month follow-up and CPAP compliance report review.  Compliance report data is from September 17, 2024 to December 15, 2024.  Patient has a 98% compliance for usage over 4 hours.  She is currently using VAuto mode with a minimum pressure of 8 cmH2O and maximum pressure of 20 cmH2O  Pressure support of 4 cm of water.  Patient's 98th percentile leaks is significantly high at 88.1 L/min.  Per chart review previous compliance report showed leakage was in the 70s.  AHI remains below 10 at 6.3. This is grossly unchanged from her previous visit.  Today patient states overall she is getting better relief than previously with her CPAP machine.  ESS reported today is 0.  Patient does state she continues to feel some air leakage during the night but she just repositions herself in the bed and sometimes will hold her hand on the mask and she feels this helps to resolve it.  She states she used to get up 4 times a night  to use the bathroom and now only twice.  She does continue to complain of dry mouth and having to wash her mouth out with water when she goes to the bathroom despite having humidified air.  As per previous visit it appears Thalia had ordered a new mask fitting to include a full face mask with a foam liner.  It appears patient does not currently have a mask with a foam liner but rather continues to have a silicone mask.  She states she was just given a new box of masks about 2 weeks ago that she believes is the silicone mask.    Review of Systems:  Review of Systems   Constitutional:  Negative for fever, chills, weight loss, weight gain, activity change, appetite change, fatigue, night sweats and weakness.   HENT:  Negative  for nosebleeds, postnasal drip, rhinorrhea, sinus pressure, sore throat, trouble swallowing, voice change, congestion, ear pain and hearing loss.    Eyes:  Negative for redness and itching.   Respiratory:  Positive for apnea, snoring and somnolence. Negative for cough, hemoptysis, sputum production, choking, chest tightness, shortness of breath, wheezing, orthopnea, previous hospitialization due to pulmonary problems, asthma nighttime symptoms, pleurisy, dyspnea on extertion, use of rescue inhaler and Paroxysmal Nocturnal Dyspnea.    Cardiovascular:  Negative for chest pain, palpitations and leg swelling.   Genitourinary:  Negative for difficulty urinating and hematuria.   Endocrine:  Negative for polydipsia, polyphagia, cold intolerance, heat intolerance and polyuria.    Musculoskeletal:  Negative for arthralgias, back pain, gait problem, joint swelling and myalgias.   Skin:  Negative for rash.   Gastrointestinal:  Negative for nausea, vomiting, abdominal pain, abdominal distention and acid reflux.   Neurological:  Negative for dizziness, syncope, weakness, light-headedness and headaches.   Hematological:  Negative for adenopathy. Does not bruise/bleed easily and no excessive bruising.   Psychiatric/Behavioral:  Negative for confusion and sleep disturbance. The patient is not nervous/anxious.          Allergies:   Review of patient's allergies indicates:   Allergen Reactions    Meperidine Nausea Only       Medications:      Past Medical History:      Past Medical History:   Diagnosis Date    Eye degeneration     left    Hypertension        Family History:      Family History   Problem Relation Name Age of Onset    Heart disease Mother      Heart disease Father      Cancer Sister      Cancer Son          Social History:      Past Surgical History:   Procedure Laterality Date    DILATION AND CURETTAGE OF UTERUS      EYE SURGERY      HYSTERECTOMY      TOTAL KNEE ARTHROPLASTY Bilateral        Physical Exam:  Vitals:     12/16/24 1016   BP: (!) 166/73   Pulse: (!) 56   Resp: 16     Physical Exam   Constitutional: She is oriented to person, place, and time. She appears well-developed and well-nourished. She appears not cachectic. No distress.   HENT:   Head: Normocephalic.   Right Ear: External ear normal.   Left Ear: External ear normal.   Nose: Nose normal. No mucosal edema. No polyps.   Mouth/Throat: Uvula is midline and oropharynx is clear and moist. Mucous membranes are moist. Normal dentition. No oropharyngeal exudate. Oropharynx is clear. Mallampati Score: III.   Neck: No JVD present. No tracheal deviation present. No thyromegaly present.   Cardiovascular: Regular rhythm, normal heart sounds and intact distal pulses. Exam reveals no gallop and no friction rub.   No murmur heard.  Bradycardic - appears this is baseline for her   Pulmonary/Chest: Normal expansion, symmetric chest wall expansion, effort normal and breath sounds normal. No stridor. No respiratory distress. She has no decreased breath sounds. She has no wheezes. She has no rhonchi. She has no rales. Chest wall is not dull to percussion. She exhibits no tenderness. Negative for egophony. Negative for tactile fremitus.   Abdominal: Soft. She exhibits no distension.   Musculoskeletal:         General: No tenderness, deformity or edema. Normal range of motion.      Cervical back: Normal range of motion and neck supple.      Comments: Right-sided wrist splint in place    Patient ambulates with cane   Lymphadenopathy: No supraclavicular adenopathy is present.     She has no cervical adenopathy.     She has no axillary adenopathy.   Neurological: She is alert and oriented to person, place, and time.   Skin: Skin is warm and dry. No rash noted. She is not diaphoretic. No cyanosis or erythema. No pallor. Nails show no clubbing.   Psychiatric: She has a normal mood and affect. Her behavior is normal.         Accessory Clinical Data:        STOPBANG    Do you snore loudly?  no  Do you feel tired, fatigued or sleepy during the day? yes  Has anyone observed that you stop breathing in your sleep? no  Do you have or been treated for high blood pressure? yes  BMI: 36.12  Age > 50? yes  Neck circumference > 40 cm: no  Gender: FM    Assessment and Plan:    Problem List Items Addressed This Visit       Sleep apnea treated with nocturnal BiPAP - Primary     After review of compliance report, patient continues to have significant amount of leakage.  It also appears patient still does not have full facemask with foam liner, but rather is still using silicone mask.   Per chart review it appears order was placed for foam liner to [facility] back in September after her most recent visit with Thalia.  We discussed with patient to try going to [facility] in-person (address given to patient in clinic) and bring her masks with her to see if they can swap them out for ones with a foam liner.  We will have the patient return in 3 months to see how new mask is fitting.  Both nurse Murrell and DAMION discussed the above plan with patient and she is in agreement.  All the questions were answered. She is agreeable to the above plan.   Other than that is appears patient is having good relief and management of her sleep apnea with her CPAP machine.       Follow up in about 3 months (around 3/16/2025) for with Lesli walker sleep apnea compliance report review with new foam liner mask..

## 2024-12-24 RX ORDER — CITALOPRAM 20 MG/1
20 TABLET, FILM COATED ORAL
Qty: 90 TABLET | Refills: 2 | Status: SHIPPED | OUTPATIENT
Start: 2024-12-24

## 2025-01-03 ENCOUNTER — OFFICE VISIT (OUTPATIENT)
Dept: FAMILY MEDICINE | Facility: CLINIC | Age: 85
End: 2025-01-03
Payer: MEDICARE

## 2025-01-03 VITALS
HEART RATE: 88 BPM | DIASTOLIC BLOOD PRESSURE: 60 MMHG | RESPIRATION RATE: 16 BRPM | BODY MASS INDEX: 35.85 KG/M2 | WEIGHT: 210 LBS | OXYGEN SATURATION: 98 % | HEIGHT: 64 IN | SYSTOLIC BLOOD PRESSURE: 130 MMHG | TEMPERATURE: 99 F

## 2025-01-03 DIAGNOSIS — N18.32 STAGE 3B CHRONIC KIDNEY DISEASE: Chronic | ICD-10-CM

## 2025-01-03 DIAGNOSIS — D32.9 BENIGN NEOPLASM OF MENINGES: Chronic | ICD-10-CM

## 2025-01-03 DIAGNOSIS — E66.812 CLASS 2 SEVERE OBESITY WITH SERIOUS COMORBIDITY AND BODY MASS INDEX (BMI) OF 35.0 TO 35.9 IN ADULT, UNSPECIFIED OBESITY TYPE: Chronic | ICD-10-CM

## 2025-01-03 DIAGNOSIS — F32.9 REACTIVE DEPRESSION: Primary | Chronic | ICD-10-CM

## 2025-01-03 DIAGNOSIS — E66.01 CLASS 2 SEVERE OBESITY WITH SERIOUS COMORBIDITY AND BODY MASS INDEX (BMI) OF 35.0 TO 35.9 IN ADULT, UNSPECIFIED OBESITY TYPE: Chronic | ICD-10-CM

## 2025-01-03 DIAGNOSIS — I70.0 AORTIC ATHEROSCLEROSIS: Chronic | ICD-10-CM

## 2025-01-03 DIAGNOSIS — L98.9 SKIN LESION OF BACK: ICD-10-CM

## 2025-01-03 PROCEDURE — 99214 OFFICE O/P EST MOD 30 MIN: CPT | Mod: S$PBB,,, | Performed by: NURSE PRACTITIONER

## 2025-01-03 RX ORDER — CITALOPRAM 40 MG/1
40 TABLET, FILM COATED ORAL DAILY
Qty: 90 TABLET | Refills: 3 | Status: SHIPPED | OUTPATIENT
Start: 2025-01-03 | End: 2026-01-03

## 2025-01-03 NOTE — PROGRESS NOTES
Subjective:       Patient ID: Iman Wood is a 84 y.o. female.    Chief Complaint: Follow-up (Pt is here for a follow up, pt is having surgery Monday the 13th on her wrist.)    Dr Sosa is taking plate out of Rt wrist on Jan 13; cardioloigist Dr Pedraza cleared her for the procedure.     -150/60 at home. Today BP is at goal in the clinic.     Now on BiPAP for her ROWAN. Compliant with use.     Scaly, scabbed, non-healing skin lesion right upper back; she is due for skin evaluation with her dermatologist Dr David.     Worsening depression symptoms in view of her daughter recent colorectal cancer diagnosis. She feels that her current dose of Celexa is not controlling her symptoms as it once did.         Review of Systems   Constitutional:  Negative for chills and fever.   Respiratory:  Negative for cough, chest tightness, shortness of breath and wheezing.    Cardiovascular:  Negative for chest pain and palpitations.   Musculoskeletal:  Positive for arthralgias. Negative for myalgias.   Neurological:  Negative for dizziness and light-headedness.   Psychiatric/Behavioral:  Positive for dysphoric mood. The patient is nervous/anxious.            Past Medical History:  Past Medical History:   Diagnosis Date    Hypertension       Past Surgical History:   Procedure Laterality Date    DILATION AND CURETTAGE OF UTERUS      EYE SURGERY      HYSTERECTOMY      TOTAL KNEE ARTHROPLASTY Bilateral       Review of patient's allergies indicates:   Allergen Reactions    Meperidine Nausea Only      Current Outpatient Medications   Medication Sig Dispense Refill    ADULT LOW DOSE ASPIRIN 81 mg EC tablet Take 81 mg by mouth Daily.      allopurinoL (ZYLOPRIM) 100 MG tablet Take 100 mg by mouth once daily.      atorvastatin (LIPITOR) 20 MG tablet TAKE 1 TABLET BY MOUTH EVERYDAY AT BEDTIME 90 tablet 3    citalopram (CELEXA) 40 MG tablet Take 1 tablet (40 mg total) by mouth once daily. 90 tablet 3    colchicine (COLCRYS) 0.6 mg  tablet Take 2 tablet by mouth now and 1 tablet by mouth 1 hour later as directed. Can repeat dose after 3 days prn gout. 12 tablet 3    loratadine (CLARITIN) 10 mg tablet TAKE 1 TABLET BY MOUTH EVERY DAY 90 tablet 3    losartan (COZAAR) 100 MG tablet Take 100 mg by mouth Daily.      metoprolol tartrate (LOPRESSOR) 50 MG tablet Take 1 tablet (50 mg total) by mouth 2 (two) times daily. 180 tablet 3    montelukast (SINGULAIR) 10 mg tablet Take 1 tablet by mouth every evening. 90 tablet 1    NIFEdipine (PROCARDIA-XL) 60 MG (OSM) 24 hr tablet Take 1 tablet (60 mg total) by mouth once daily. (Patient taking differently: Take 60 mg by mouth 2 (two) times a day.) 90 tablet 3    sumatriptan (IMITREX) 25 MG Tab Take 2 tablets (50 mg total) by mouth every 6 to 8 hours as needed (headache). Do not exceed more than 4 pills in 24 hours 10 tablet 0    vitamin D (VITAMIN D3) 1000 units Tab Take 1,000 Units by mouth once daily.       No current facility-administered medications for this visit.     Social History     Socioeconomic History    Marital status:    Occupational History    Occupation: Retired RN   Tobacco Use    Smoking status: Never     Passive exposure: Past    Smokeless tobacco: Never   Substance and Sexual Activity    Alcohol use: Never    Drug use: Not Currently    Sexual activity: Not Currently     Social Drivers of Health     Financial Resource Strain: Low Risk  (11/8/2023)    Overall Financial Resource Strain (CARDIA)     Difficulty of Paying Living Expenses: Not hard at all   Food Insecurity: No Food Insecurity (11/8/2023)    Hunger Vital Sign     Worried About Running Out of Food in the Last Year: Never true     Ran Out of Food in the Last Year: Never true   Transportation Needs: No Transportation Needs (11/8/2023)    PRAPARE - Transportation     Lack of Transportation (Medical): No     Lack of Transportation (Non-Medical): No   Physical Activity: Inactive (11/8/2023)    Exercise Vital Sign     Days of  Exercise per Week: 0 days     Minutes of Exercise per Session: 10 min   Stress: No Stress Concern Present (11/8/2023)    [employer] [place] [employer] - Occupational Stress Questionnaire     Feeling of Stress : Only a little   Housing Stability: Unknown (11/8/2023)    Housing Stability Vital Sign     Unable to Pay for Housing in the Last Year: No     Unstable Housing in the Last Year: No      Family History   Problem Relation Name Age of Onset    Heart disease Mother      Heart disease Father      Cancer Sister      Cancer Son          Objective:      Physical Exam  Constitutional:       Appearance: She is well-developed.   HENT:      Head: Normocephalic and atraumatic.   Eyes:      General: No scleral icterus.     Conjunctiva/sclera: Conjunctivae normal.      Pupils: Pupils are equal, round, and reactive to light.      Comments: Ptosis Rt upper lid   Neck:      Thyroid: No thyroid mass.      Trachea: Trachea normal.   Cardiovascular:      Rate and Rhythm: Normal rate and regular rhythm.   Pulmonary:      Effort: Pulmonary effort is normal.      Breath sounds: Normal breath sounds.   Musculoskeletal:      Cervical back: Normal range of motion and neck supple.   Neurological:      Mental Status: She is alert and oriented to person, place, and time.   Psychiatric:         Mood and Affect: Mood normal.         Behavior: Behavior normal.         Assessment:     1. Reactive depression Sub-optimally controlled   2. Class 2 severe obesity with serious comorbidity and body mass index (BMI) of 35.0 to 35.9 in adult, unspecified obesity type Active   3. Skin lesion of back Active   4. Stage 3b chronic kidney disease Stable   5. Aortic atherosclerosis Stable   6. Benign neoplasm of meninges Stable     Plan:       PROBLEM LIST     Reactive depression  Comments:  adjusting her dose of Celexa; RTC in 4-6 wk for f/u  Orders:  -     citalopram (CELEXA) 40 MG tablet; Take 1 tablet (40 mg total) by mouth once daily.   Dispense: 90 tablet; Refill: 3    Class 2 severe obesity with serious comorbidity and body mass index (BMI) of 35.0 to 35.9 in adult, unspecified obesity type  Comments:  recommend DASH diet and walking 20-30 minutes daily. Use assistive devices while walking    Skin lesion of back  Comments:  suspicious scabbed lesion right upper back; she is contacting her Dermatologist Dr aDvid to schedule skin evaluation    Stage 3b chronic kidney disease  Comments:  Continue to avoid NSAIDS and PPIs    Aortic atherosclerosis  Comments:  keep upcoming appt w/ Dr Pedraza. Take all medications as directed. Follow DASH diet which is also low in fat.    Benign neoplasm of meninges  Comments:  no significant change in CT 2/20/24 [facility]

## 2025-01-27 RX ORDER — METOPROLOL TARTRATE 50 MG/1
50 TABLET ORAL 2 TIMES DAILY
Qty: 180 TABLET | Refills: 2 | Status: SHIPPED | OUTPATIENT
Start: 2025-01-27

## 2025-02-17 ENCOUNTER — OFFICE VISIT (OUTPATIENT)
Dept: FAMILY MEDICINE | Facility: CLINIC | Age: 85
End: 2025-02-17
Payer: MEDICARE

## 2025-02-17 VITALS
DIASTOLIC BLOOD PRESSURE: 60 MMHG | BODY MASS INDEX: 35.85 KG/M2 | OXYGEN SATURATION: 98 % | HEIGHT: 64 IN | RESPIRATION RATE: 16 BRPM | TEMPERATURE: 99 F | SYSTOLIC BLOOD PRESSURE: 140 MMHG | WEIGHT: 210 LBS | HEART RATE: 76 BPM

## 2025-02-17 DIAGNOSIS — R11.2 NAUSEA AND VOMITING, UNSPECIFIED VOMITING TYPE: Primary | ICD-10-CM

## 2025-02-17 DIAGNOSIS — F32.9 REACTIVE DEPRESSION: Chronic | ICD-10-CM

## 2025-02-17 DIAGNOSIS — R42 DIZZINESS: ICD-10-CM

## 2025-02-17 DIAGNOSIS — G47.00 INSOMNIA, UNSPECIFIED TYPE: Chronic | ICD-10-CM

## 2025-02-17 RX ORDER — MIRTAZAPINE 15 MG/1
15 TABLET, FILM COATED ORAL NIGHTLY
Qty: 90 TABLET | Refills: 3 | Status: SHIPPED | OUTPATIENT
Start: 2025-02-17 | End: 2025-02-17

## 2025-02-17 RX ORDER — CLONIDINE HYDROCHLORIDE 0.1 MG/1
0.1 TABLET ORAL
COMMUNITY

## 2025-02-17 RX ORDER — MIRTAZAPINE 15 MG/1
15 TABLET, FILM COATED ORAL NIGHTLY
Qty: 30 TABLET | Refills: 2 | Status: SHIPPED | OUTPATIENT
Start: 2025-02-17

## 2025-02-17 NOTE — PROGRESS NOTES
Subjective:       Patient ID: Iman Wood is a 84 y.o. female.    Chief Complaint: Follow-up (Pt is here for a follow up, went to [facility] yesterday. Was weak, couldn't walk, daughter reports she was have tremors, Was told it might have been her citalopram. Pt already had appointment scheduled for today as a normal check up after starting higher dose of citalopram, has only been on medication since the 5th due to mail in pharmacy. Pt complains of dizziness, and nausea. )    Felt disoriented Friday morning upon waking.... started feeling better at the day went on. Occurred again Sunday morning upon waking. She had a hard time getting out of bed. It was difficult to walk. She started having the shakes and tremors. Vomiting started Sunday. No diarrhea.  Paramedics were called and she was brought to the ED. Her BP was extremely elevated upon arrival. She received IV fluids and ppx abx. Her WBC was normal and urinalysis did not show evidence of infection. Blood cultures normal. CT of brain demonstrated no acute abnormalities. CXR normal. Normal EKG rhythm. It appeared she had some mild dehydration from vomiting. She admits to limited water intake.     She recently had a dose increase of her Celexa for depression symptoms. This is the only recent medication adjustment. Since symptoms started after she started the new dose, the medication has been held. Also, she had been taking melatonin at bedtime. This was not on her medication list.       Review of Systems   Constitutional:  Negative for chills and fever.   Respiratory:  Negative for cough, chest tightness, shortness of breath and wheezing.    Cardiovascular:  Negative for chest pain and palpitations.   Gastrointestinal:  Positive for nausea and vomiting. Negative for abdominal pain and diarrhea.   Musculoskeletal:  Positive for arthralgias. Negative for myalgias.   Neurological:  Negative for dizziness and light-headedness.   Psychiatric/Behavioral:   Positive for dysphoric mood. The patient is nervous/anxious.            Past Medical History:  Past Medical History:   Diagnosis Date    Hypertension       Past Surgical History:   Procedure Laterality Date    DILATION AND CURETTAGE OF UTERUS      EYE SURGERY      HYSTERECTOMY      TOTAL KNEE ARTHROPLASTY Bilateral       Review of patient's allergies indicates:   Allergen Reactions    Meperidine Nausea Only      Current Medications[1]  Social History[2]   Family History   Problem Relation Name Age of Onset    Heart disease Mother      Heart disease Father      Cancer Sister      Cancer Son          Objective:      Physical Exam  Constitutional:       Appearance: She is well-developed.   HENT:      Head: Normocephalic and atraumatic.   Eyes:      General: No scleral icterus.     Conjunctiva/sclera: Conjunctivae normal.      Pupils: Pupils are equal, round, and reactive to light.      Comments: Ptosis Rt upper lid   Neck:      Thyroid: No thyroid mass.      Trachea: Trachea normal.   Cardiovascular:      Rate and Rhythm: Normal rate and regular rhythm.   Pulmonary:      Effort: Pulmonary effort is normal.      Breath sounds: Normal breath sounds.   Musculoskeletal:      Cervical back: Normal range of motion and neck supple.   Neurological:      Mental Status: She is alert and oriented to person, place, and time.   Psychiatric:         Mood and Affect: Mood normal.         Behavior: Behavior normal.         Assessment:     1. Nausea and vomiting, unspecified vomiting type Acute   2. Dizziness    3. Reactive depression Active   4. Insomnia, unspecified type Active     Plan:       PROBLEM LIST     Nausea and vomiting, unspecified vomiting type  Comments:  increase oral hydration H20; recommend at least 4 bottles of water daily instead of 2; consume bland meals for the next few day; call if symptoms persists    Dizziness    Reactive depression  Comments:  stop Celexa; start Remeron hs; RTC in 4 weeks for f/u  Orders:  -      Discontinue: mirtazapine (REMERON) 15 MG tablet; Take 1 tablet (15 mg total) by mouth every evening.  Dispense: 90 tablet; Refill: 3  -     mirtazapine (REMERON) 15 MG tablet; Take 1 tablet (15 mg total) by mouth every evening.  Dispense: 30 tablet; Refill: 2    Insomnia, unspecified type  Comments:  stop melatonin; start Remeron hs; RTC in 4 weeks for f/u        Reviewed hospital notesl from her ED visit on 2/16/25 via [facility]        [1]   Current Outpatient Medications   Medication Sig Dispense Refill    ADULT LOW DOSE ASPIRIN 81 mg EC tablet Take 81 mg by mouth Daily.      allopurinoL (ZYLOPRIM) 100 MG tablet Take 100 mg by mouth once daily.      atorvastatin (LIPITOR) 20 MG tablet TAKE 1 TABLET BY MOUTH EVERYDAY AT BEDTIME 90 tablet 3    cloNIDine (CATAPRES) 0.1 MG tablet Take 0.1 mg by mouth as needed (takes if bp get higher than 160).      colchicine (COLCRYS) 0.6 mg tablet Take 2 tablet by mouth now and 1 tablet by mouth 1 hour later as directed. Can repeat dose after 3 days prn gout. 12 tablet 3    loratadine (CLARITIN) 10 mg tablet TAKE 1 TABLET BY MOUTH EVERY DAY 90 tablet 3    losartan (COZAAR) 100 MG tablet Take 100 mg by mouth Daily.      metoprolol tartrate (LOPRESSOR) 50 MG tablet Take 1 tablet by mouth twice daily. 180 tablet 2    mirtazapine (REMERON) 15 MG tablet Take 1 tablet (15 mg total) by mouth every evening. 30 tablet 2    montelukast (SINGULAIR) 10 mg tablet Take 1 tablet by mouth every evening. 90 tablet 1    NIFEdipine (PROCARDIA-XL) 60 MG (OSM) 24 hr tablet Take 1 tablet (60 mg total) by mouth once daily. (Patient taking differently: Take 60 mg by mouth 2 (two) times a day.) 90 tablet 3    sumatriptan (IMITREX) 25 MG Tab Take 2 tablets (50 mg total) by mouth every 6 to 8 hours as needed (headache). Do not exceed more than 4 pills in 24 hours 10 tablet 0    vitamin D (VITAMIN D3) 1000 units Tab Take 1,000 Units by mouth once daily.       No current facility-administered medications for  this visit.   [2]   Social History  Socioeconomic History    Marital status:    Occupational History    Occupation: Retired RN   Tobacco Use    Smoking status: Never     Passive exposure: Past    Smokeless tobacco: Never   Substance and Sexual Activity    Alcohol use: Never    Drug use: Not Currently    Sexual activity: Not Currently     Social Drivers of Health     Financial Resource Strain: Low Risk  (11/8/2023)    Overall Financial Resource Strain (CARDIA)     Difficulty of Paying Living Expenses: Not hard at all   Food Insecurity: No Food Insecurity (11/8/2023)    Hunger Vital Sign     Worried About Running Out of Food in the Last Year: Never true     Ran Out of Food in the Last Year: Never true   Transportation Needs: No Transportation Needs (11/8/2023)    PRAPARE - Transportation     Lack of Transportation (Medical): No     Lack of Transportation (Non-Medical): No   Physical Activity: Inactive (11/8/2023)    Exercise Vital Sign     Days of Exercise per Week: 0 days     Minutes of Exercise per Session: 10 min   Stress: No Stress Concern Present (11/8/2023)    [employer] [place] [employer] - Occupational Stress Questionnaire     Feeling of Stress : Only a little   Housing Stability: Unknown (11/8/2023)    Housing Stability Vital Sign     Unable to Pay for Housing in the Last Year: No     Unstable Housing in the Last Year: No

## 2025-02-21 ENCOUNTER — PATIENT MESSAGE (OUTPATIENT)
Dept: FAMILY MEDICINE | Facility: CLINIC | Age: 85
End: 2025-02-21
Payer: MEDICARE

## 2025-02-21 DIAGNOSIS — R42 DIZZINESS: Primary | ICD-10-CM

## 2025-02-21 RX ORDER — MECLIZINE HCL 12.5 MG 12.5 MG/1
12.5 TABLET ORAL 3 TIMES DAILY PRN
Qty: 30 TABLET | Refills: 0 | Status: SHIPPED | OUTPATIENT
Start: 2025-02-21

## 2025-02-24 DIAGNOSIS — R42 DIZZINESS: ICD-10-CM

## 2025-02-24 NOTE — TELEPHONE ENCOUNTER
----- Message from Makeda sent at 2/24/2025 11:49 AM CST -----  [facility] is calling for clarification on medication mirtazapine (REMERON) 15 MG tablet please call them back at [phone]

## 2025-02-25 RX ORDER — MECLIZINE HCL 12.5 MG 12.5 MG/1
12.5 TABLET ORAL 3 TIMES DAILY PRN
Qty: 30 TABLET | Refills: 0 | Status: SHIPPED | OUTPATIENT
Start: 2025-02-25

## 2025-03-03 ENCOUNTER — OFFICE VISIT (OUTPATIENT)
Dept: FAMILY MEDICINE | Facility: CLINIC | Age: 85
End: 2025-03-03
Payer: MEDICARE

## 2025-03-03 VITALS
SYSTOLIC BLOOD PRESSURE: 140 MMHG | BODY MASS INDEX: 35.85 KG/M2 | RESPIRATION RATE: 16 BRPM | OXYGEN SATURATION: 98 % | HEIGHT: 64 IN | HEART RATE: 69 BPM | TEMPERATURE: 99 F | DIASTOLIC BLOOD PRESSURE: 62 MMHG | WEIGHT: 210 LBS

## 2025-03-03 DIAGNOSIS — G47.00 INSOMNIA, UNSPECIFIED TYPE: Chronic | ICD-10-CM

## 2025-03-03 DIAGNOSIS — R42 DIZZINESS: Primary | Chronic | ICD-10-CM

## 2025-03-03 DIAGNOSIS — F32.9 REACTIVE DEPRESSION: Chronic | ICD-10-CM

## 2025-03-03 PROCEDURE — 99213 OFFICE O/P EST LOW 20 MIN: CPT | Mod: S$PBB,,, | Performed by: NURSE PRACTITIONER

## 2025-03-03 NOTE — PROGRESS NOTES
Subjective:       Patient ID: Iman Wood is a 84 y.o. female.    Chief Complaint: Follow-up (Pt is here for a follow up, pt has not started the meclizine. Does report the dizziness has improved some. )    Return visit for nausea and dizziness. A continuation from our visit 2/17/25; She still has some positional dizziness. Nauseas is much better.  She was provided with meclinize 25 mg at our previous appt, but she has not started this yes. She is now taking the mirtazapine 15 mg hs for depression symptoms with insomnia. She reports she is sleeping much better now. Her daughter mentions one morning she appeared to be confused...though Ms Metcalf disagrees with this assessment.       Review of Systems   Constitutional:  Negative for chills and fever.   Respiratory:  Negative for cough, chest tightness, shortness of breath and wheezing.    Cardiovascular:  Negative for chest pain and palpitations.   Gastrointestinal:  Positive for nausea. Negative for abdominal pain, diarrhea and vomiting.   Musculoskeletal:  Positive for arthralgias. Negative for myalgias.   Neurological:  Negative for dizziness and light-headedness.   Psychiatric/Behavioral:  Positive for dysphoric mood. Negative for sleep disturbance. The patient is nervous/anxious.            Past Medical History:  Past Medical History:   Diagnosis Date    Hypertension       Past Surgical History:   Procedure Laterality Date    DILATION AND CURETTAGE OF UTERUS      EYE SURGERY      HYSTERECTOMY      TOTAL KNEE ARTHROPLASTY Bilateral       Review of patient's allergies indicates:   Allergen Reactions    Meperidine Nausea Only      Current Medications[1]  Social History[2]   Family History   Problem Relation Name Age of Onset    Heart disease Mother      Heart disease Father      Cancer Sister      Cancer Son          Objective:      Physical Exam  Constitutional:       Appearance: She is well-developed.   HENT:      Head: Normocephalic and atraumatic.   Eyes:       General: No scleral icterus.     Conjunctiva/sclera: Conjunctivae normal.      Pupils: Pupils are equal, round, and reactive to light.      Comments: Ptosis Rt upper lid   Neck:      Thyroid: No thyroid mass.      Trachea: Trachea normal.   Cardiovascular:      Rate and Rhythm: Normal rate and regular rhythm.   Pulmonary:      Effort: Pulmonary effort is normal.   Musculoskeletal:      Cervical back: Normal range of motion and neck supple.   Neurological:      Mental Status: She is alert and oriented to person, place, and time.   Psychiatric:         Mood and Affect: Mood normal.         Behavior: Behavior normal.         Assessment:     1. Dizziness Stable   2. Reactive depression Sub-optimally controlled   3. Insomnia, unspecified type Stable     Plan:       PROBLEM LIST     Dizziness  Comments:  recommend start meclizine TID prn    Reactive depression  Comments:  some minimal improvement w/ mirtazapine; should become more therapeutic with time. Reassess in 12 wk.    Insomnia, unspecified type  Comments:  improved w/ mirtazapine hs               [1]   Current Outpatient Medications   Medication Sig Dispense Refill    ADULT LOW DOSE ASPIRIN 81 mg EC tablet Take 81 mg by mouth Daily.      allopurinoL (ZYLOPRIM) 100 MG tablet Take 100 mg by mouth once daily.      atorvastatin (LIPITOR) 20 MG tablet TAKE 1 TABLET BY MOUTH EVERYDAY AT BEDTIME 90 tablet 3    cloNIDine (CATAPRES) 0.1 MG tablet Take 0.1 mg by mouth as needed (takes if bp get higher than 160).      colchicine (COLCRYS) 0.6 mg tablet Take 2 tablet by mouth now and 1 tablet by mouth 1 hour later as directed. Can repeat dose after 3 days prn gout. 12 tablet 3    loratadine (CLARITIN) 10 mg tablet TAKE 1 TABLET BY MOUTH EVERY DAY 90 tablet 3    losartan (COZAAR) 100 MG tablet Take 100 mg by mouth Daily.      meclizine (ANTIVERT) 12.5 mg tablet Take 1 tablet (12.5 mg total) by mouth 3 (three) times daily as needed for Dizziness or Nausea. 30 tablet 0     metoprolol tartrate (LOPRESSOR) 50 MG tablet Take 1 tablet by mouth twice daily. 180 tablet 2    mirtazapine (REMERON) 15 MG tablet Take 1 tablet (15 mg total) by mouth every evening. 30 tablet 2    montelukast (SINGULAIR) 10 mg tablet Take 1 tablet by mouth every evening. 90 tablet 1    NIFEdipine (PROCARDIA-XL) 60 MG (OSM) 24 hr tablet Take 1 tablet (60 mg total) by mouth once daily. (Patient taking differently: Take 60 mg by mouth 2 (two) times a day.) 90 tablet 3    sumatriptan (IMITREX) 25 MG Tab Take 2 tablets (50 mg total) by mouth every 6 to 8 hours as needed (headache). Do not exceed more than 4 pills in 24 hours 10 tablet 0    vitamin D (VITAMIN D3) 1000 units Tab Take 1,000 Units by mouth once daily.       No current facility-administered medications for this visit.   [2]   Social History  Socioeconomic History    Marital status:    Occupational History    Occupation: Retired RN   Tobacco Use    Smoking status: Never     Passive exposure: Past    Smokeless tobacco: Never   Substance and Sexual Activity    Alcohol use: Never    Drug use: Not Currently    Sexual activity: Not Currently     Social Drivers of Health     Financial Resource Strain: Low Risk  (11/8/2023)    Overall Financial Resource Strain (CARDIA)     Difficulty of Paying Living Expenses: Not hard at all   Food Insecurity: No Food Insecurity (11/8/2023)    Hunger Vital Sign     Worried About Running Out of Food in the Last Year: Never true     Ran Out of Food in the Last Year: Never true   Transportation Needs: No Transportation Needs (11/8/2023)    PRAPARE - Transportation     Lack of Transportation (Medical): No     Lack of Transportation (Non-Medical): No   Physical Activity: Inactive (11/8/2023)    Exercise Vital Sign     Days of Exercise per Week: 0 days     Minutes of Exercise per Session: 10 min   Stress: No Stress Concern Present (11/8/2023)    [employer] - Occupational Stress Questionnaire      Feeling of Stress : Only a little   Housing Stability: Unknown (11/8/2023)    Housing Stability Vital Sign     Unable to Pay for Housing in the Last Year: No     Unstable Housing in the Last Year: No

## 2025-03-17 ENCOUNTER — OFFICE VISIT (OUTPATIENT)
Dept: PULMONOLOGY | Facility: CLINIC | Age: 85
End: 2025-03-17
Payer: MEDICARE

## 2025-03-17 VITALS
BODY MASS INDEX: 36.02 KG/M2 | HEIGHT: 64 IN | RESPIRATION RATE: 20 BRPM | WEIGHT: 211 LBS | HEART RATE: 52 BPM | SYSTOLIC BLOOD PRESSURE: 150 MMHG | DIASTOLIC BLOOD PRESSURE: 74 MMHG | OXYGEN SATURATION: 93 %

## 2025-03-17 DIAGNOSIS — G47.30 SLEEP APNEA TREATED WITH NOCTURNAL BIPAP: Primary | ICD-10-CM

## 2025-03-17 PROCEDURE — 99213 OFFICE O/P EST LOW 20 MIN: CPT | Mod: S$PBB,,, | Performed by: PHYSICIAN ASSISTANT

## 2025-03-17 NOTE — PROGRESS NOTES
Pulmonary Medicine Clinic Note    Patient Identification:   Patient ID: Iman Wood is a 84 y.o. female.    Referring Provider:  No ref. provider found     Chief Complaint:  Sleep apnea      History of Present Illness:    Iman Wood is a 84 y.o. female who presents to clinic for sleep apnea follow-up.  Compliance report today is from dates 12/14/2024 - 03/13/2025 with a 100% compliance and usage greater than 4 hours 90/90 days.  Patient remains on V auto mode with a minimum pressure of 8 cm water and a maximum pressure of 20 cm water with pressure support of 4 cm water.  AHI continues to improve, today is 4.8 from 6.3 at previous visit.  Patient continues to have significantly elevated 95th percentile leaks at 80.5 L per minute but this is lower than last report.  Patient tells me she just changed her mask yesterday evening, her previous 1 had a crack in it that was leading to some leakage.  Per review of previous office visit, it was recommended for patient to go to [facility] and have mask fitting to consider the foam liner.  She states she went to [facility] but is still using the silicone.  She is very satisfied with her current mask fitting and CPAP settings and wishes to continue her CPAP for management of her sleep apnea. ESS today is 7.    Review of Systems:  Review of Systems   Constitutional:  Negative for fever, chills, weight loss, weight gain, activity change, appetite change, fatigue, night sweats and weakness.   HENT:  Negative for nosebleeds, postnasal drip, rhinorrhea, sinus pressure, sore throat, trouble swallowing, voice change, congestion, ear pain and hearing loss.    Eyes:  Negative for redness and itching.   Respiratory:  Positive for apnea, snoring and somnolence. Negative for cough, hemoptysis, sputum production, choking, chest tightness, shortness of breath, wheezing, orthopnea, previous hospitialization due to pulmonary problems, asthma nighttime symptoms,  pleurisy, dyspnea on extertion, use of rescue inhaler and Paroxysmal Nocturnal Dyspnea.    Cardiovascular:  Negative for chest pain, palpitations and leg swelling.   Genitourinary:  Negative for difficulty urinating and hematuria.   Endocrine:  Negative for polydipsia, polyphagia, cold intolerance, heat intolerance and polyuria.    Musculoskeletal:  Negative for arthralgias, back pain, gait problem, joint swelling and myalgias.   Skin:  Negative for rash.   Gastrointestinal:  Negative for nausea, vomiting, abdominal pain, abdominal distention and acid reflux.   Neurological:  Negative for dizziness, syncope, weakness, light-headedness and headaches.   Hematological:  Negative for adenopathy. Does not bruise/bleed easily and no excessive bruising.   Psychiatric/Behavioral:  Negative for confusion and sleep disturbance. The patient is not nervous/anxious.          Allergies:   Review of patient's allergies indicates:   Allergen Reactions    Meperidine Nausea Only       Medications:      Past Medical History:      Past Medical History:   Diagnosis Date    Hypertension        Family History:      Family History   Problem Relation Name Age of Onset    Heart disease Mother      Heart disease Father      Cancer Sister      Cancer Son          Social History:      Past Surgical History:   Procedure Laterality Date    DILATION AND CURETTAGE OF UTERUS      EYE SURGERY      HYSTERECTOMY      TOTAL KNEE ARTHROPLASTY Bilateral        Physical Exam:  Vitals:    03/17/25 1325   BP: (!) 150/74   Pulse: (!) 52   Resp: 20     Physical Exam   Constitutional: She is oriented to person, place, and time. She appears well-developed. She appears not cachectic. No distress.   Ambulates with cane   HENT:   Head: Normocephalic.   Right Ear: External ear normal.   Left Ear: External ear normal.   Nose: Nose normal. No mucosal edema. No polyps.   Mouth/Throat: Uvula is midline and oropharynx is clear and moist. Mucous membranes are moist.  Normal dentition. No oropharyngeal exudate. Oropharynx is clear. Mallampati Score: III.   Neck: No JVD present. No tracheal deviation present. No thyromegaly present.   Cardiovascular: Normal rate, regular rhythm, normal heart sounds and intact distal pulses. Exam reveals no gallop and no friction rub.   No murmur heard.  Pulmonary/Chest: Normal expansion, symmetric chest wall expansion, effort normal and breath sounds normal. No stridor. No respiratory distress. She has no decreased breath sounds. She has no wheezes. She has no rhonchi. She has no rales. Chest wall is not dull to percussion. She exhibits no tenderness. Negative for egophony. Negative for tactile fremitus.   Abdominal: Soft. She exhibits no distension.   Musculoskeletal:         General: No tenderness, deformity or edema. Normal range of motion.      Cervical back: Normal range of motion and neck supple.   Lymphadenopathy: No supraclavicular adenopathy is present.     She has no cervical adenopathy.     She has no axillary adenopathy.   Neurological: She is alert and oriented to person, place, and time.   Skin: Skin is warm and dry. No rash noted. She is not diaphoretic. No cyanosis or erythema. No pallor. Nails show no clubbing.   Psychiatric: She has a normal mood and affect. Her behavior is normal.         Accessory Clinical Data:      ESS = 7    STOPBANG = 4    Do you snore loudly?  Do you feel tired, fatigued or sleepy during the day?  Has anyone observed that you stop breathing in your sleep?  Do you have or been treated for high blood pressure?  BMI:  Age > 50?  Neck circumference > 40 cm:  Gender:     Assessment and Plan:    Problem List Items Addressed This Visit       Sleep apnea treated with nocturnal BiPAP - Primary    Current Assessment & Plan   Patient has excellent compliance with current CPAP settings.  She continues to have significant amount of leak despite being fitted for new mask. Patient realized she had a crack in the silicone  of her mask and just changed it out yesterday.  None the less, patient's AHI is satisfactory and continues to improve, ESS is & and symptoms are well controlled.  We will continue with current CPAP settings and have patient RTC in 6 months or sooner if needed.   She just received new box of supplies and does not need refills at this time.              Follow up in about 6 months (around 9/17/2025) for CPAP compliance with [facility]

## 2025-03-17 NOTE — ASSESSMENT & PLAN NOTE
Patient has excellent compliance with current CPAP settings.  She continues to have significant amount of leak despite being fitted for new mask. Patient realized she had a crack in the silicone of her mask and just changed it out yesterday.  None the less, patient's AHI is satisfactory and continues to improve, ESS is & and symptoms are well controlled.  We will continue with current CPAP settings and have patient RTC in 6 months or sooner if needed.   She just received new box of supplies and does not need refills at this time.

## 2025-03-26 ENCOUNTER — TELEPHONE (OUTPATIENT)
Dept: GASTROENTEROLOGY | Facility: CLINIC | Age: 85
End: 2025-03-26
Payer: MEDICARE

## 2025-03-26 NOTE — TELEPHONE ENCOUNTER
----- Message from Bertha sent at 3/26/2025 11:20 AM CDT -----  Contact: IOANA CLOUD [[id number]]  ..Type:  Patient Requesting CallWho Called: IOANA CLOUD [[id number]]What is the call regarding?: pt is calling about the letter in the mail.Would the patient rather a call back or a response via [facility]?  callBe Call Back Number: [phone] (home) Additional Information:

## 2025-03-28 ENCOUNTER — TELEPHONE (OUTPATIENT)
Facility: CLINIC | Age: 85
End: 2025-03-28
Payer: MEDICARE

## 2025-03-28 VITALS — SYSTOLIC BLOOD PRESSURE: 139 MMHG | DIASTOLIC BLOOD PRESSURE: 71 MMHG

## 2025-04-20 DIAGNOSIS — J30.2 SEASONAL ALLERGIES: Chronic | ICD-10-CM

## 2025-04-24 RX ORDER — MONTELUKAST SODIUM 10 MG/1
10 TABLET ORAL NIGHTLY
Qty: 90 TABLET | Refills: 0 | Status: SHIPPED | OUTPATIENT
Start: 2025-04-24

## 2025-05-19 ENCOUNTER — OFFICE VISIT (OUTPATIENT)
Dept: GASTROENTEROLOGY | Facility: CLINIC | Age: 85
End: 2025-05-19
Payer: MEDICARE

## 2025-05-19 VITALS
DIASTOLIC BLOOD PRESSURE: 80 MMHG | OXYGEN SATURATION: 95 % | HEIGHT: 64 IN | BODY MASS INDEX: 36.54 KG/M2 | HEART RATE: 74 BPM | WEIGHT: 214 LBS | SYSTOLIC BLOOD PRESSURE: 157 MMHG

## 2025-05-19 DIAGNOSIS — Z80.0 FAMILY HISTORY OF COLON CANCER: ICD-10-CM

## 2025-05-19 DIAGNOSIS — K57.30 DIVERTICULOSIS OF COLON: ICD-10-CM

## 2025-05-19 DIAGNOSIS — K59.00 CONSTIPATION, UNSPECIFIED CONSTIPATION TYPE: Primary | ICD-10-CM

## 2025-05-19 DIAGNOSIS — Z86.0100 HISTORY OF COLON POLYPS: ICD-10-CM

## 2025-05-19 PROCEDURE — 99213 OFFICE O/P EST LOW 20 MIN: CPT | Mod: S$PBB,,,

## 2025-05-19 RX ORDER — POLYETHYLENE GLYCOL 3350, SODIUM SULFATE ANHYDROUS, SODIUM BICARBONATE, SODIUM CHLORIDE, POTASSIUM CHLORIDE 236; 22.74; 6.74; 5.86; 2.97 G/4L; G/4L; G/4L; G/4L; G/4L
4 POWDER, FOR SOLUTION ORAL ONCE
Qty: 4000 ML | Refills: 0 | Status: SHIPPED | OUTPATIENT
Start: 2025-05-19 | End: 2025-05-19

## 2025-05-19 NOTE — LETTER
May 19, 2025        Irma Ferguson NP  [address]  [address]  [place] [address]             [facility]  [address] [address]  Phone: [phone]  Fax: [phone]   Patient: Iman Wood   MR Number: [id number]   YOB: 1940   Date of Visit: 5/19/2025       Dear Dr. Ferguson:    Thank you for referring Iman Wood to me for evaluation. Below are the relevant portions of my assessment and plan of care.            If you have questions, please do not hesitate to call me. I look forward to following Iman along with you.    Sincerely,      Clementine Pete, NP           CC  No Recipients

## 2025-05-19 NOTE — PROGRESS NOTES
Clinic Note    Reason for visit:  The primary encounter diagnosis was Constipation, unspecified constipation type. Diagnoses of Diverticulosis of colon, History of colon polyps, and Family history of colon cancer were also pertinent to this visit.    PCP: Irma Ferguson       HPI:  This is a 84 y.o. female who was last seen by Dr. Tang in 2021. Daughter present. Due for colonoscopy. H/o colon polyps. No GI issues. Takes stool softener occasionally. Her daughter had colon cancer.   Last time she had to have US to get IV access.     Colonoscopy w/[facility] 9/7/2021: Moderate diverticulosis of the sigmoid colon. IH/EH. 3 of 4 polyps retrieved. 2 TA, 1 hyp. Repeat colon in 3 yr.     Review of Systems   Constitutional:  Negative for fatigue, fever and unexpected weight change.   HENT:  Negative for mouth sores, postnasal drip, sore throat and trouble swallowing.    Eyes:  Negative for pain, discharge and eye dryness.   Respiratory:  Negative for apnea, cough, choking, chest tightness, shortness of breath and wheezing.    Cardiovascular:  Negative for chest pain, palpitations and leg swelling.   Gastrointestinal:  Negative for abdominal distention, abdominal pain, anal bleeding, blood in stool, change in bowel habit, constipation, diarrhea, nausea, rectal pain, vomiting, reflux and fecal incontinence.   Genitourinary:  Negative for bladder incontinence, dysuria and hematuria.   Musculoskeletal:  Negative for arthralgias, back pain and joint swelling.   Integumentary:  Negative for color change and rash.   Allergic/Immunologic: Negative for environmental allergies and food allergies.   Neurological:  Negative for seizures and headaches.   Hematological:  Negative for adenopathy. Does not bruise/bleed easily.        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    MGUS (monoclonal gammopathy of unknown significance)     Mixed hyperlipidemia     Obesity, Class II, BMI 35-39.9, isolated (see actual BMI)     Overactive bladder 09/23/2019    Sleep apnea, unspecified      Past Surgical History:   Procedure Laterality Date    DILATION AND CURETTAGE OF UTERUS      EYE SURGERY      HYSTERECTOMY      TOTAL KNEE ARTHROPLASTY Bilateral     WRIST SURGERY       Family History   Problem Relation Name Age of Onset    Heart disease Mother      Heart disease Father      Lung cancer Sister      Cancer Son      Throat cancer Son      Colon cancer Son      Colon cancer Daughter      Rectal cancer Neg Hx      Stomach cancer Neg Hx      Liver disease Neg Hx      Liver cancer Neg Hx       Social History[1]  Review of patient's allergies indicates:   Allergen Reactions    Meperidine Nausea Only      Medication List with Changes/Refills   New Medications    POLYETHYLENE GLYCOL (GOLYTELY) 236-22.74-6.74 -5.86 GRAM SUSPENSION    Take 4,000 mLs (4 L total) by mouth once. for 1 dose   Current Medications    ADULT LOW DOSE ASPIRIN 81 MG EC TABLET    Take 81 mg by mouth Daily.    ALLOPURINOL (ZYLOPRIM) 100 MG TABLET    Take 100 mg by mouth once daily.    ATORVASTATIN (LIPITOR) 20 MG TABLET    TAKE 1 TABLET BY MOUTH EVERYDAY AT BEDTIME    CLONIDINE (CATAPRES) 0.1 MG TABLET    Take 0.1 mg by mouth as needed (takes if bp get higher than 160).    LORATADINE (CLARITIN) 10 MG TABLET    TAKE 1 TABLET BY MOUTH EVERY DAY    LOSARTAN (COZAAR) 100 MG TABLET    Take 100 mg by mouth Daily.    MECLIZINE (ANTIVERT) 12.5 MG TABLET    Take 1 tablet (12.5 mg total) by mouth 3 (three) times daily as needed for Dizziness or Nausea.    METOPROLOL TARTRATE (LOPRESSOR) 50 MG TABLET    Take 1 tablet by mouth twice daily.    MIRTAZAPINE (REMERON) 15 MG TABLET    Take 1 tablet (15 mg total) by mouth every evening.    MONTELUKAST (SINGULAIR) 10 MG TABLET    Take 1 tablet by mouth every evening.  "   NIFEDIPINE (PROCARDIA-XL) 60 MG (OSM) 24 HR TABLET    Take 1 tablet (60 mg total) by mouth once daily.    SUMATRIPTAN (IMITREX) 25 MG TAB    Take 2 tablets (50 mg total) by mouth every 6 to 8 hours as needed (headache). Do not exceed more than 4 pills in 24 hours    VITAMIN D (VITAMIN D3) 1000 UNITS TAB    Take 1,000 Units by mouth once daily.   Discontinued Medications    COLCHICINE (COLCRYS) 0.6 MG TABLET    Take 2 tablet by mouth now and 1 tablet by mouth 1 hour later as directed. Can repeat dose after 3 days prn gout.         Vital Signs:  BP (!) 157/80 (BP Location: Left arm, Patient Position: Sitting)   Pulse 74   Ht 5' 4" (1.626 m)   Wt 97.1 kg (214 lb)   SpO2 95%   BMI 36.73 kg/m²        Physical Exam  Vitals reviewed.   Constitutional:       General: She is awake. She is not in acute distress.     Appearance: Normal appearance. She is well-developed. She is obese. She is not ill-appearing, toxic-appearing or diaphoretic.      Comments: Ambulates with cane   HENT:      Head: Normocephalic and atraumatic.      Nose: Nose normal.      Mouth/Throat:      Mouth: Mucous membranes are moist.      Pharynx: Oropharynx is clear. No oropharyngeal exudate or posterior oropharyngeal erythema.   Eyes:      General: No scleral icterus.        Right eye: No discharge.      Conjunctiva/sclera: Conjunctivae normal.      Comments: Left eye ptosis   Neck:      Trachea: Trachea normal.   Cardiovascular:      Rate and Rhythm: Normal rate and regular rhythm.      Pulses:           Radial pulses are 2+ on the right side and 2+ on the left side.   Pulmonary:      Effort: Pulmonary effort is normal. No respiratory distress.      Breath sounds: No stridor. No wheezing.   Chest:      Chest wall: No tenderness.   Abdominal:      General: Bowel sounds are normal. There is no distension.      Palpations: Abdomen is soft. There is no fluid wave, hepatomegaly or mass.      Tenderness: There is no abdominal tenderness. There is no " guarding or rebound.   Musculoskeletal:         General: No tenderness or deformity.      Cervical back: No tenderness.      Right lower leg: No edema.      Left lower leg: No edema.   Lymphadenopathy:      Cervical: No cervical adenopathy.   Skin:     General: Skin is warm and dry.      Capillary Refill: Capillary refill takes less than 2 seconds.      Coloration: Skin is not cyanotic, jaundiced or pale.   Neurological:      General: No focal deficit present.      Mental Status: She is alert and oriented to person, place, and time.      Motor: No tremor.   Psychiatric:         Attention and Perception: Attention normal.         Mood and Affect: Mood and affect normal.         Speech: Speech normal.         Behavior: Behavior normal. Behavior is cooperative.            All of the data above and below has been reviewed by myself and any further interpretations will be reflected in the assessment and plan.   The data includes review of external notes, and independent interpretation of lab results, procedures, x-rays, and imaging reports.      Assessment:  Constipation, unspecified constipation type  Comments:  stool softener as needed, controlled    Diverticulosis of colon    History of colon polyps  -     Ambulatory Referral to External Surgery  -     polyethylene glycol (GOLYTELY) 236-22.74-6.74 -5.86 gram suspension; Take 4,000 mLs (4 L total) by mouth once. for 1 dose  Dispense: 4000 mL; Refill: 0    Family history of colon cancer  -     Ambulatory Referral to External Surgery      Colon due. FH of colon cancer.   No GI issues.   Stool softener as needed.     Recommendations:    Schedule colonoscopy with Dr. Hickman.     If any tests, procedures, or imaging has been ordered and you are not contacted to schedule within 1-2 weeks, then you may call the central scheduling department directly at [phone].     Risks, benefits, and alternatives of medical management, any associated procedures, and/or treatment  discussed with the patient. Patient given opportunity to ask questions and voices understanding. Patient has elected to proceed with the recommended care modalities as discussed.    Follow up if symptoms worsen or fail to improve.    Order summary:  Orders Placed This Encounter   Procedures    Ambulatory Referral to External Surgery          Instructed patient to notify my office if they have not been contacted within two weeks after any procedures, submitting any samples (biopsies, blood, stool, urine, etc.) or after any imaging (X-ray, CT, MRI, etc.).      Clementine Pete NP    This document may have been created using a voice recognition transcribing system. Incorrect words or phrases may have been missed during proofreading. Please interpret accordingly or contact me for clarification.          [1]   Social History  Tobacco Use    Smoking status: Never     Passive exposure: Past    Smokeless tobacco: Never   Substance Use Topics    Alcohol use: Never    Drug use: Not Currently

## 2025-05-19 NOTE — PATIENT INSTRUCTIONS
Schedule colonoscopy with Dr. Hickman.     If any tests, procedures, or imaging has been ordered and you are not contacted to schedule within 1-2 weeks, then you may call the central scheduling department directly at [phone].   Please notify my office if you have not been contacted within two weeks after any procedures, submitting any samples (biopsies, blood, stool, urine, etc.) or after any imaging (X-ray, CT, MRI, etc.).

## 2025-06-23 ENCOUNTER — OFFICE VISIT (OUTPATIENT)
Dept: FAMILY MEDICINE | Facility: CLINIC | Age: 85
End: 2025-06-23
Payer: MEDICARE

## 2025-06-23 VITALS
TEMPERATURE: 98 F | WEIGHT: 212 LBS | SYSTOLIC BLOOD PRESSURE: 156 MMHG | BODY MASS INDEX: 36.19 KG/M2 | RESPIRATION RATE: 16 BRPM | OXYGEN SATURATION: 95 % | DIASTOLIC BLOOD PRESSURE: 74 MMHG | HEIGHT: 64 IN | HEART RATE: 87 BPM

## 2025-06-23 DIAGNOSIS — H61.21 IMPACTED CERUMEN OF RIGHT EAR: ICD-10-CM

## 2025-06-23 DIAGNOSIS — E66.812 CLASS 2 OBESITY WITH ALVEOLAR HYPOVENTILATION, SERIOUS COMORBIDITY, AND BODY MASS INDEX (BMI) OF 36.0 TO 36.9 IN ADULT: Chronic | ICD-10-CM

## 2025-06-23 DIAGNOSIS — E66.2 CLASS 2 OBESITY WITH ALVEOLAR HYPOVENTILATION, SERIOUS COMORBIDITY, AND BODY MASS INDEX (BMI) OF 36.0 TO 36.9 IN ADULT: Chronic | ICD-10-CM

## 2025-06-23 DIAGNOSIS — N18.32 STAGE 3B CHRONIC KIDNEY DISEASE: Chronic | ICD-10-CM

## 2025-06-23 DIAGNOSIS — R73.03 PREDIABETES: Chronic | ICD-10-CM

## 2025-06-23 DIAGNOSIS — I10 SYSTOLIC HYPERTENSION: Primary | Chronic | ICD-10-CM

## 2025-06-23 NOTE — PROGRESS NOTES
Subjective:       Patient ID: Iman Wood is a 84 y.o. female.    Chief Complaint: Follow-up (Pt is here for her 3 month follow up )    -160/70 at home. Today her BP in clinic is elevated. She has not been using her prn clonidine.       Now on BiPAP for her ROWAN. Compliant with use. Some irritation around mouth/nose where his mask sits.    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 She had problems with wax build up in her ears that makes it difficult to hear... even with her hearing aids. She has cerumen blockage in her Rt ear today, her left ear is clear.         Review of Systems   Constitutional:  Negative for chills and fever.   HENT:  Positive for hearing loss. Negative for ear discharge and ear pain.    Respiratory:  Negative for cough, chest tightness, shortness of breath and wheezing.    Cardiovascular:  Negative for chest pain and palpitations.   Gastrointestinal:  Negative for abdominal pain, diarrhea and vomiting.   Musculoskeletal:  Positive for arthralgias. Negative for myalgias.   Neurological:  Negative for dizziness and light-headedness.   Psychiatric/Behavioral:  Positive for dysphoric mood. Negative for sleep disturbance. The patient is nervous/anxious.            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    MGUS (monoclonal gammopathy of unknown  significance)     Mixed hyperlipidemia     Obesity, Class II, BMI 35-39.9, isolated (see actual BMI)     Overactive bladder 09/23/2019    Sleep apnea, unspecified       Past Surgical History:   Procedure Laterality Date    DILATION AND CURETTAGE OF UTERUS      EYE SURGERY      HYSTERECTOMY      TOTAL KNEE ARTHROPLASTY Bilateral     WRIST SURGERY        Review of patient's allergies indicates:   Allergen Reactions    Meperidine Nausea Only      Current Medications[1]  Social History[2]   Family History   Problem Relation Name Age of Onset    Heart disease Mother      Heart disease Father      Lung cancer Sister      Cancer Son      Throat cancer Son      Colon cancer Son      Colon cancer Daughter      Rectal cancer Neg Hx      Stomach cancer Neg Hx      Liver disease Neg Hx      Liver cancer Neg Hx          Objective:      Physical Exam  Constitutional:       Appearance: She is well-developed.   HENT:      Head: Normocephalic and atraumatic.      Right Ear: There is impacted cerumen.      Left Ear: Tympanic membrane, ear canal and external ear normal.      Ears:      Comments: Ear Irrigation-- The patient had cerumen removed from the right ear canal in order to visualize the tympanic membrane. This was accomplished with ear lavage & cerumen loop with no complications. The tympanic membrane was subsequently visualized.  Eyes:      General: No scleral icterus.     Conjunctiva/sclera: Conjunctivae normal.      Pupils: Pupils are equal, round, and reactive to light.      Comments: Ptosis Rt upper lid   Neck:      Thyroid: No thyroid mass.      Trachea: Trachea normal.   Cardiovascular:      Rate and Rhythm: Normal rate and regular rhythm.   Pulmonary:      Effort: Pulmonary effort is normal.   Musculoskeletal:      Cervical back: Normal range of motion and neck supple.   Neurological:      Mental Status: She is alert and oriented to person, place, and time.   Psychiatric:         Mood and Affect: Mood normal.          Behavior: Behavior normal.         Assessment:     1. Systolic hypertension Active   2. Impacted cerumen of right ear Active   3. Stage 3b chronic kidney disease Stable   4. Prediabetes Active   5. Class 2 obesity with alveolar hypoventilation, serious comorbidity, and body mass index (BMI) of 36.0 to 36.9 in adult Active     Plan:       PROBLEM LIST     Systolic hypertension  Comments:  see notes below  Orders:  -     Hemoglobin A1C  -     Lipid Panel    Impacted cerumen of right ear  Comments:  cerumen irrigated from ear    Stage 3b chronic kidney disease  Comments:  avoid PPIs and NSAIDS; keep upcoming nephrology appt    Prediabetes  Comments:  obtaining A1c today; her last A1c was 6.3%  Orders:  -     Hemoglobin A1C  -     Lipid Panel    Class 2 obesity with alveolar hypoventilation, serious comorbidity, and body mass index (BMI) of 36.0 to 36.9 in adult        Discussed lifestyle modifications diet and exercise to lose weight. Discussed the cash cost of Wegovy and Zepbound running $500 monthly and she cannot afford this.   Last A1c 6.3%; obtaining A1c today  Recommend to start using clonidine when SBP > 160  Irrigated wax out of right ear. Lt ear did not need irrigation  Recommend using barrier protection ointment on face with CPAP mask... suggested Aquaphor or Zinc oxide       [1]   Current Outpatient Medications   Medication Sig Dispense Refill    ADULT LOW DOSE ASPIRIN 81 mg EC tablet Take 81 mg by mouth Daily.      allopurinoL (ZYLOPRIM) 100 MG tablet Take 100 mg by mouth once daily.      atorvastatin (LIPITOR) 20 MG tablet TAKE 1 TABLET BY MOUTH EVERYDAY AT BEDTIME 90 tablet 3    cloNIDine (CATAPRES) 0.1 MG tablet Take 0.1 mg by mouth as needed (takes if bp get higher than 160).      loratadine (CLARITIN) 10 mg tablet TAKE 1 TABLET BY MOUTH EVERY DAY 90 tablet 3    losartan (COZAAR) 100 MG tablet Take 100 mg by mouth Daily.      meclizine (ANTIVERT) 12.5 mg tablet Take 1 tablet (12.5 mg total) by mouth 3  (three) times daily as needed for Dizziness or Nausea. 30 tablet 0    metoprolol tartrate (LOPRESSOR) 50 MG tablet Take 1 tablet by mouth twice daily. 180 tablet 2    mirtazapine (REMERON) 15 MG tablet Take 1 tablet (15 mg total) by mouth every evening. 30 tablet 2    montelukast (SINGULAIR) 10 mg tablet Take 1 tablet by mouth every evening. 90 tablet 0    NIFEdipine (PROCARDIA-XL) 60 MG (OSM) 24 hr tablet Take 1 tablet (60 mg total) by mouth once daily. 90 tablet 3    sumatriptan (IMITREX) 25 MG Tab Take 2 tablets (50 mg total) by mouth every 6 to 8 hours as needed (headache). Do not exceed more than 4 pills in 24 hours (Patient not taking: Reported on 6/23/2025) 10 tablet 0    vitamin D (VITAMIN D3) 1000 units Tab Take 1,000 Units by mouth once daily.       No current facility-administered medications for this visit.   [2]   Social History  Socioeconomic History    Marital status:    Occupational History    Occupation: Retired RN   Tobacco Use    Smoking status: Never     Passive exposure: Past    Smokeless tobacco: Never   Substance and Sexual Activity    Alcohol use: Never    Drug use: Not Currently    Sexual activity: Not Currently     Social Drivers of Health     Financial Resource Strain: Low Risk  (11/8/2023)    Overall Financial Resource Strain (CARDIA)     Difficulty of Paying Living Expenses: Not hard at all   Food Insecurity: No Food Insecurity (11/8/2023)    Hunger Vital Sign     Worried About Running Out of Food in the Last Year: Never true     Ran Out of Food in the Last Year: Never true   Transportation Needs: No Transportation Needs (11/8/2023)    PRAPARE - Transportation     Lack of Transportation (Medical): No     Lack of Transportation (Non-Medical): No   Physical Activity: Inactive (11/8/2023)    Exercise Vital Sign     Days of Exercise per Week: 0 days     Minutes of Exercise per Session: 10 min   Stress: No Stress Concern Present (11/8/2023)    [employer] -  Occupational Stress Questionnaire     Feeling of Stress : Only a little   Housing Stability: Unknown (11/8/2023)    Housing Stability Vital Sign     Unable to Pay for Housing in the Last Year: No     Unstable Housing in the Last Year: No

## 2025-06-24 PROBLEM — E66.2 CLASS 2 OBESITY WITH ALVEOLAR HYPOVENTILATION, SERIOUS COMORBIDITY, AND BODY MASS INDEX (BMI) OF 36.0 TO 36.9 IN ADULT: Status: ACTIVE | Noted: 2023-01-19

## 2025-06-24 LAB
CHOLEST SERPL-MCNC: 205 MG/DL (ref 0–150)
CHOLEST SERPL-MSCNC: 182 MG/DL (ref 100–200)
ESTIMATED AVERAGE GLUCOSE: 123 MG/DL (ref 70–126)
HBA1C MFR BLD: 5.9 % (ref 4–5.6)
HDLC SERPL-MCNC: 84 MG/DL
LDL/HDL RATIO: 0.7 (ref 1–3)
LDLC SERPL CALC-MCNC: 57 MG/DL (ref 0–100)

## 2025-06-27 ENCOUNTER — RESULTS FOLLOW-UP (OUTPATIENT)
Dept: FAMILY MEDICINE | Facility: CLINIC | Age: 85
End: 2025-06-27

## 2025-06-27 ENCOUNTER — PATIENT MESSAGE (OUTPATIENT)
Dept: FAMILY MEDICINE | Facility: CLINIC | Age: 85
End: 2025-06-27
Payer: MEDICARE

## 2025-07-19 DIAGNOSIS — J30.2 SEASONAL ALLERGIES: Chronic | ICD-10-CM

## 2025-07-21 RX ORDER — MONTELUKAST SODIUM 10 MG/1
10 TABLET ORAL NIGHTLY
Qty: 90 TABLET | Refills: 0 | Status: SHIPPED | OUTPATIENT
Start: 2025-07-21 | End: 2025-07-23 | Stop reason: SDUPTHER

## 2025-07-23 DIAGNOSIS — J30.2 SEASONAL ALLERGIES: Chronic | ICD-10-CM

## 2025-07-23 RX ORDER — MONTELUKAST SODIUM 10 MG/1
10 TABLET ORAL NIGHTLY
Qty: 90 TABLET | Refills: 3 | Status: SHIPPED | OUTPATIENT
Start: 2025-07-23